# Patient Record
Sex: MALE | Race: WHITE | NOT HISPANIC OR LATINO | Employment: FULL TIME | URBAN - METROPOLITAN AREA
[De-identification: names, ages, dates, MRNs, and addresses within clinical notes are randomized per-mention and may not be internally consistent; named-entity substitution may affect disease eponyms.]

---

## 2020-02-14 ENCOUNTER — AMBULATORY - HEALTHEAST (OUTPATIENT)
Dept: LAB | Facility: CLINIC | Age: 30
End: 2020-02-14

## 2020-02-14 DIAGNOSIS — Z01.818 PREOPERATIVE GENERAL PHYSICAL EXAMINATION: ICD-10-CM

## 2020-02-14 LAB
ANION GAP SERPL CALCULATED.3IONS-SCNC: 8 MMOL/L (ref 5–18)
BASOPHILS # BLD AUTO: 0.1 THOU/UL (ref 0–0.2)
BASOPHILS NFR BLD AUTO: 1 % (ref 0–2)
BUN SERPL-MCNC: 12 MG/DL (ref 8–22)
CALCIUM SERPL-MCNC: 9.9 MG/DL (ref 8.5–10.5)
CHLORIDE BLD-SCNC: 101 MMOL/L (ref 98–107)
CO2 SERPL-SCNC: 28 MMOL/L (ref 22–31)
CREAT SERPL-MCNC: 0.73 MG/DL (ref 0.7–1.3)
EOSINOPHIL # BLD AUTO: 0.1 THOU/UL (ref 0–0.4)
EOSINOPHIL NFR BLD AUTO: 2 % (ref 0–6)
ERYTHROCYTE [DISTWIDTH] IN BLOOD BY AUTOMATED COUNT: 11.7 % (ref 11–14.5)
GFR SERPL CREATININE-BSD FRML MDRD: >60 ML/MIN/1.73M2
GLUCOSE BLD-MCNC: 109 MG/DL (ref 70–125)
HCT VFR BLD AUTO: 44.1 % (ref 40–54)
HGB BLD-MCNC: 15.5 G/DL (ref 14–18)
LYMPHOCYTES # BLD AUTO: 1.2 THOU/UL (ref 0.8–4.4)
LYMPHOCYTES NFR BLD AUTO: 18 % (ref 20–40)
MCH RBC QN AUTO: 30.9 PG (ref 27–34)
MCHC RBC AUTO-ENTMCNC: 35.1 G/DL (ref 32–36)
MCV RBC AUTO: 88 FL (ref 80–100)
MONOCYTES # BLD AUTO: 0.4 THOU/UL (ref 0–0.9)
MONOCYTES NFR BLD AUTO: 6 % (ref 2–10)
NEUTROPHILS # BLD AUTO: 4.9 THOU/UL (ref 2–7.7)
NEUTROPHILS NFR BLD AUTO: 73 % (ref 50–70)
PLATELET # BLD AUTO: 144 THOU/UL (ref 140–440)
PMV BLD AUTO: 9.3 FL (ref 8.5–12.5)
POTASSIUM BLD-SCNC: 4.3 MMOL/L (ref 3.5–5)
RBC # BLD AUTO: 5.02 MILL/UL (ref 4.4–6.2)
SODIUM SERPL-SCNC: 137 MMOL/L (ref 136–145)
WBC: 6.7 THOU/UL (ref 4–11)

## 2021-08-28 ENCOUNTER — NURSE TRIAGE (OUTPATIENT)
Dept: NURSING | Facility: CLINIC | Age: 31
End: 2021-08-28

## 2021-08-29 ENCOUNTER — HOSPITAL ENCOUNTER (EMERGENCY)
Facility: CLINIC | Age: 31
Discharge: HOME | End: 2021-08-29
Attending: EMERGENCY MEDICINE | Admitting: EMERGENCY MEDICINE
Payer: COMMERCIAL

## 2021-08-29 ENCOUNTER — OFFICE VISIT (OUTPATIENT)
Dept: FAMILY MEDICINE | Facility: CLINIC | Age: 31
End: 2021-08-29
Payer: COMMERCIAL

## 2021-08-29 VITALS
SYSTOLIC BLOOD PRESSURE: 109 MMHG | TEMPERATURE: 98.1 F | DIASTOLIC BLOOD PRESSURE: 62 MMHG | OXYGEN SATURATION: 96 % | BODY MASS INDEX: 21.74 KG/M2 | HEART RATE: 85 BPM | WEIGHT: 169.31 LBS

## 2021-08-29 VITALS
TEMPERATURE: 97.6 F | HEIGHT: 74 IN | SYSTOLIC BLOOD PRESSURE: 115 MMHG | DIASTOLIC BLOOD PRESSURE: 75 MMHG | RESPIRATION RATE: 16 BRPM | OXYGEN SATURATION: 99 % | WEIGHT: 169.5 LBS | BODY MASS INDEX: 21.75 KG/M2 | HEART RATE: 73 BPM

## 2021-08-29 DIAGNOSIS — R63.4 WEIGHT LOSS: ICD-10-CM

## 2021-08-29 DIAGNOSIS — R73.9 HYPERGLYCEMIA: ICD-10-CM

## 2021-08-29 DIAGNOSIS — D69.6 THROMBOCYTOPENIA (H): ICD-10-CM

## 2021-08-29 DIAGNOSIS — R82.4 KETONURIA: ICD-10-CM

## 2021-08-29 DIAGNOSIS — E11.9 DIABETES MELLITUS, NEW ONSET (H): ICD-10-CM

## 2021-08-29 DIAGNOSIS — R73.9 ELEVATED BLOOD SUGAR: Primary | ICD-10-CM

## 2021-08-29 PROBLEM — F19.10 POLYSUBSTANCE ABUSE (H): Status: ACTIVE | Noted: 2019-03-15

## 2021-08-29 PROBLEM — N35.912 BULBOUS URETHRAL STRICTURE: Status: ACTIVE | Noted: 2020-02-03

## 2021-08-29 PROBLEM — F33.41 RECURRENT MAJOR DEPRESSIVE DISORDER, IN PARTIAL REMISSION (H): Status: ACTIVE | Noted: 2017-06-21

## 2021-08-29 PROBLEM — F17.200 TOBACCO USE DISORDER: Status: ACTIVE | Noted: 2017-06-21

## 2021-08-29 LAB
ALBUMIN SERPL-MCNC: 4.6 G/DL (ref 3.5–5)
ALBUMIN UR-MCNC: NEGATIVE MG/DL
ALP SERPL-CCNC: 111 U/L (ref 45–120)
ALT SERPL W P-5'-P-CCNC: 26 U/L (ref 0–45)
ANION GAP SERPL CALCULATED.3IONS-SCNC: 10 MMOL/L (ref 5–18)
ANION GAP SERPL CALCULATED.3IONS-SCNC: 15 MMOL/L (ref 5–18)
APPEARANCE UR: CLEAR
AST SERPL W P-5'-P-CCNC: 23 U/L (ref 0–40)
BASE EXCESS BLDV CALC-SCNC: 1.6 MMOL/L
BASE EXCESS BLDV CALC-SCNC: 4 MMOL/L
BILIRUB DIRECT SERPL-MCNC: 0.3 MG/DL
BILIRUB SERPL-MCNC: 1 MG/DL (ref 0–1)
BILIRUB UR QL STRIP: NEGATIVE
BUN SERPL-MCNC: 10 MG/DL (ref 8–22)
BUN SERPL-MCNC: 8 MG/DL (ref 8–22)
CALCIUM SERPL-MCNC: 9.6 MG/DL (ref 8.5–10.5)
CALCIUM SERPL-MCNC: 9.6 MG/DL (ref 8.5–10.5)
CHLORIDE BLD-SCNC: 94 MMOL/L (ref 98–107)
CHLORIDE BLD-SCNC: 98 MMOL/L (ref 98–107)
CO2 SERPL-SCNC: 16 MMOL/L (ref 22–31)
CO2 SERPL-SCNC: 27 MMOL/L (ref 22–31)
COLOR UR AUTO: YELLOW
CREAT SERPL-MCNC: 0.75 MG/DL (ref 0.7–1.3)
CREAT SERPL-MCNC: 0.81 MG/DL (ref 0.7–1.3)
ERYTHROCYTE [DISTWIDTH] IN BLOOD BY AUTOMATED COUNT: 11.9 % (ref 10–15)
GFR SERPL CREATININE-BSD FRML MDRD: >90 ML/MIN/1.73M2
GFR SERPL CREATININE-BSD FRML MDRD: >90 ML/MIN/1.73M2
GLUCOSE BLD-MCNC: 280 MG/DL (ref 70–125)
GLUCOSE BLD-MCNC: 289 MG/DL (ref 70–125)
GLUCOSE BLDC GLUCOMTR-MCNC: 272 MG/DL (ref 70–125)
GLUCOSE UR STRIP-MCNC: 500 MG/DL
HBA1C MFR BLD: >14 %
HCO3 BLDV-SCNC: 25 MMOL/L (ref 24–30)
HCO3 BLDV-SCNC: 26 MMOL/L (ref 24–30)
HCT VFR BLD AUTO: 41.6 % (ref 40–53)
HGB BLD-MCNC: 14.5 G/DL (ref 13.3–17.7)
HGB UR QL STRIP: NEGATIVE
KETONES BLD-SCNC: 0.58 MMOL/L
KETONES BLD-SCNC: 1.71 MMOL/L
KETONES UR STRIP-MCNC: >=160 MG/DL
LEUKOCYTE ESTERASE UR QL STRIP: NEGATIVE
MCH RBC QN AUTO: 29.4 PG (ref 26.5–33)
MCHC RBC AUTO-ENTMCNC: 34.9 G/DL (ref 31.5–36.5)
MCV RBC AUTO: 84 FL (ref 78–100)
NITRATE UR QL: NEGATIVE
OXYHGB MFR BLDV: 66.8 % (ref 70–75)
OXYHGB MFR BLDV: 78.4 % (ref 70–75)
PCO2 BLDV: 47 MM HG (ref 35–50)
PCO2 BLDV: 50 MM HG (ref 35–50)
PH BLDV: 7.37 [PH] (ref 7.35–7.45)
PH BLDV: 7.37 [PH] (ref 7.35–7.45)
PH UR STRIP: 5 [PH] (ref 5–8)
PLATELET # BLD AUTO: 54 10E3/UL (ref 150–450)
PO2 BLDV: 34 MM HG (ref 25–47)
PO2 BLDV: 45 MM HG (ref 25–47)
POTASSIUM BLD-SCNC: 4 MMOL/L (ref 3.5–5)
POTASSIUM BLD-SCNC: ABNORMAL MMOL/L
PROT SERPL-MCNC: 6.9 G/DL (ref 6–8)
RBC # BLD AUTO: 4.94 10E6/UL (ref 4.4–5.9)
SAO2 % BLDV: 71 % (ref 70–75)
SAO2 % BLDV: 83.9 % (ref 70–75)
SODIUM SERPL-SCNC: 129 MMOL/L (ref 136–145)
SODIUM SERPL-SCNC: 131 MMOL/L (ref 136–145)
SP GR UR STRIP: >=1.03 (ref 1–1.03)
UROBILINOGEN UR STRIP-ACNC: 0.2 E.U./DL
WBC # BLD AUTO: 4.2 10E3/UL (ref 4–11)

## 2021-08-29 PROCEDURE — 82010 KETONE BODYS QUAN: CPT | Performed by: EMERGENCY MEDICINE

## 2021-08-29 PROCEDURE — 82805 BLOOD GASES W/O2 SATURATION: CPT | Performed by: EMERGENCY MEDICINE

## 2021-08-29 PROCEDURE — 96372 THER/PROPH/DIAG INJ SC/IM: CPT

## 2021-08-29 PROCEDURE — 82248 BILIRUBIN DIRECT: CPT | Performed by: EMERGENCY MEDICINE

## 2021-08-29 PROCEDURE — 36415 COLL VENOUS BLD VENIPUNCTURE: CPT | Performed by: EMERGENCY MEDICINE

## 2021-08-29 PROCEDURE — 82374 ASSAY BLOOD CARBON DIOXIDE: CPT | Performed by: EMERGENCY MEDICINE

## 2021-08-29 PROCEDURE — 96372 THER/PROPH/DIAG INJ SC/IM: CPT | Performed by: EMERGENCY MEDICINE

## 2021-08-29 PROCEDURE — 250N000012 HC RX MED GY IP 250 OP 636 PS 637: Performed by: EMERGENCY MEDICINE

## 2021-08-29 PROCEDURE — 81003 URINALYSIS AUTO W/O SCOPE: CPT | Performed by: FAMILY MEDICINE

## 2021-08-29 PROCEDURE — 82947 ASSAY GLUCOSE BLOOD QUANT: CPT | Performed by: EMERGENCY MEDICINE

## 2021-08-29 PROCEDURE — 99204 OFFICE O/P NEW MOD 45 MIN: CPT | Performed by: FAMILY MEDICINE

## 2021-08-29 PROCEDURE — 250N000012 HC RX MED GY IP 250 OP 636 PS 637

## 2021-08-29 PROCEDURE — 99284 EMERGENCY DEPT VISIT MOD MDM: CPT | Mod: 25

## 2021-08-29 PROCEDURE — 84295 ASSAY OF SERUM SODIUM: CPT | Performed by: EMERGENCY MEDICINE

## 2021-08-29 PROCEDURE — 85027 COMPLETE CBC AUTOMATED: CPT | Performed by: EMERGENCY MEDICINE

## 2021-08-29 PROCEDURE — 83036 HEMOGLOBIN GLYCOSYLATED A1C: CPT | Performed by: EMERGENCY MEDICINE

## 2021-08-29 RX ORDER — SERTRALINE HYDROCHLORIDE 100 MG/1
100 TABLET, FILM COATED ORAL DAILY
COMMUNITY

## 2021-08-29 RX ORDER — BUPRENORPHINE AND NALOXONE 8; 2 MG/1; MG/1
1 FILM, SOLUBLE BUCCAL; SUBLINGUAL 2 TIMES DAILY
COMMUNITY

## 2021-08-29 RX ORDER — SODIUM CHLORIDE 9 MG/ML
INJECTION, SOLUTION INTRAVENOUS CONTINUOUS
Status: DISCONTINUED | OUTPATIENT
Start: 2021-08-29 | End: 2021-08-29

## 2021-08-29 RX ORDER — DISULFIRAM 250 MG/1
250 TABLET ORAL DAILY
COMMUNITY
End: 2021-09-03 | Stop reason: ALTCHOICE

## 2021-08-29 RX ORDER — DISULFIRAM 250 MG/1
TABLET ORAL
COMMUNITY
Start: 2021-08-26 | End: 2021-08-29

## 2021-08-29 RX ORDER — BUPRENORPHINE AND NALOXONE 4; 1 MG/1; MG/1
1 FILM, SOLUBLE BUCCAL; SUBLINGUAL
COMMUNITY
End: 2023-03-14

## 2021-08-29 RX ADMIN — HUMAN INSULIN 8 UNITS: 100 INJECTION, SOLUTION SUBCUTANEOUS at 15:23

## 2021-08-29 ASSESSMENT — MIFFLIN-ST. JEOR: SCORE: 1788.6

## 2021-08-29 NOTE — ED TRIAGE NOTES
Patient states he was an everyday drinker, he stopped in May and since has lost 50lbs, went to clinic today and blood sugars in 400's.  Patient c/o BLE pain also.  Patient has been fasting since yesterday.  BS in triage is 272.

## 2021-08-29 NOTE — PHARMACY-ADMISSION MEDICATION HISTORY
Pharmacy Note - Admission Medication History    Pertinent Provider Information: has not taken disulfuram for a couple of weeks     ______________________________________________________________________    Prior To Admission (PTA) med list completed and updated in EMR.       PTA Med List   Medication Sig Last Dose     buprenorphine HCl-naloxone HCl (SUBOXONE) 4-1 MG per film Place 1 Film under the tongue every evening as needed Past Week at Unknown time     buprenorphine HCl-naloxone HCl (SUBOXONE) 8-2 MG per film Place 1 Film under the tongue 2 times daily 8/29/2021 at Unknown time     disulfiram (ANTABUSE) 250 MG tablet Take 250 mg by mouth daily 8/14/2021            sertraline (ZOLOFT) 100 MG tablet Take 100 mg by mouth daily 8/29/2021 at Unknown time       Information source(s): Patient, [facility] and [place] Prescription Monitoring Program  Method of interview communication: in-person    Summary of Changes to PTA Med List  New: all      Patient was asked about OTC/herbal products specifically.  PTA med list reflects this.    In the past week, patient estimated taking medication this percent of the time:  greater than 90%.    Allergies were reviewed, assessed, and updated with the patient.      Patient does not anticipate needing any multi-use medications during admission.    The information provided in this note is only as accurate as the sources available at the time of the update(s).    Thank you for the opportunity to participate in the care of this patient.    Kilo Jones [facility]  8/29/2021 4:16 PM

## 2021-08-29 NOTE — ED PROVIDER NOTES
EMERGENCY DEPARTMENT ENCOUNTER      NAME: Hieu Carson  AGE: 31 year old male  YOB: 1990  MRN: [id number]  EVALUATION DATE & TIME: 8/29/2021  1:03 PM    PCP: [facility]    ED PROVIDER: Kim Rivera MD    Chief Complaint   Patient presents with     Hyperglycemia         FINAL IMPRESSION:  1. Diabetes mellitus, new onset (H)    2. Hyperglycemia          ED COURSE & MEDICAL DECISION MAKING:    Pertinent Labs & Imaging studies reviewed. (See chart for details)  31 year old male with history of urethral stricture opioid and tobacco abuse who presents to the Emergency Department for evaluation of 50 pound weight loss over the course the last several months since he stopped drinking alcohol with paresthesias in his fingertips and toes.  Blood sugar high with ketones in the urine prior to arrival.  Symptoms consistent with new onset diabetes.  Concern for dehydration, electrolyte abnormality, DKA, HHS.    Urinalysis already obtained showing ketones.  Hemoglobin A1c sent.  CBC, BMP, VBG, beta hydroxybutyrate pending.  If no signs of DKA/HHS, will start on Metformin for home and have him follow-up with PCP to establish care.  Referral provided.  Patient signed out to Dr. Vivas awaiting lab testing for hopeful discharge to home.          1:11 PM  I evaluated the patient to gather history and perform initial exam. ED course and treatment plan was discussed.  PPE worn: eye protection, surgical mask, n95 mask.      At the conclusion of the encounter I discussed the results of all of the tests and the disposition. The questions were answered. The patient or family acknowledged understanding and was agreeable with the care plan.      MEDICATIONS GIVEN IN THE EMERGENCY:  Medications - No data to display    NEW PRESCRIPTIONS STARTED AT TODAY'S ER VISIT  New Prescriptions    METFORMIN (GLUCOPHAGE) 1000 MG TABLET    Take 1 tablet (1,000 mg) by mouth 2 times daily (with meals)           =================================================================    HPI    Patient information was obtained from: patient     Use of Intrepreter: N/A      Hieu Carson is a 31 year old male with pertinent medical history of opiate dependence, opiate withdrawal, polysubstance abuse, tobacco use disorder who presents hyperglycemia and numbness.    Patient reports he went to the clinic today and was found to have high blood sugars in 400s and sent to ED for further evaluation. He says that he also endorses numbness to his fingers and toes.     He used to drink everyday but has stopped in May and had lost a significant amount of weight. He has no regular PCP. No known PMHx of DM but does have FMHx of grandmothers with DM. Denies abdominal cramping. No other concerns or complaints at this time.         REVIEW OF SYSTEMS  Constitutional:  Denies fever, chills, weight loss or weakness  GI:  Denies abdominal pain, nausea, vomiting, diarrhea  : Denies dysuria, denies hematuria  Musculoskeletal:  Denies any new muscle/joint pain, swelling or loss of function.  Skin:  Denies rash, pallor  Neurologic:  Denies headache, focal weakness or sensory changes. Positive for numbness in fingers and toes.     All other systems negative unless noted in HPI.      PAST MEDICAL HISTORY:  Past Medical History:   Diagnosis Date     Depressive disorder      Opioid abuse (H)      Tobacco abuse      Urethral stricture        PAST SURGICAL HISTORY:  Past Surgical History:   Procedure Laterality Date     GENITOURINARY SURGERY       URETHRAL DILATION         CURRENT MEDICATIONS:    Prior to Admission Medications   Prescriptions Last Dose Informant Patient Reported? Taking?   buprenorphine-naloxone (SUBOXONE) 2-0.5 MG SUBL   No No   Sig: Place  under the tongue. Take 1 tab at 1600 today, then  Take 1 tab 2 times daily for 1 days, then  Take 1 tab 1 times daily for 4 days, then  Take 0.5 tabs 1 times daily for 4 days   disulfiram (ANTABUSE)  "250 MG tablet   Yes No   sertraline (ZOLOFT) 50 MG tablet   Yes No   Sig: Take 50 mg by mouth daily.   sertraline (ZOLOFT) 50 MG tablet   No No   Sig: Take 1 tablet by mouth daily.      Facility-Administered Medications: None       ALLERGIES:  No Known Allergies    FAMILY HISTORY:  History reviewed. No pertinent family history.    SOCIAL HISTORY:  Social History     Tobacco Use     Smoking status: Current Every Day Smoker     Packs/day: 0.50     Years: 4.00     Pack years: 2.00     Smokeless tobacco: Never Used   Substance Use Topics     Alcohol use: Yes     Comment: occasional     Drug use: Yes     Comment: heroin        VITALS:  Patient Vitals for the past 24 hrs:   BP Temp Temp src Pulse Resp SpO2 Height Weight   08/29/21 1242 112/73 97.6  F (36.4  C) Oral 74 16 99 % 1.88 m (6' 2\") 76.9 kg (169 lb 8 oz)       PHYSICAL EXAM    General Appearance: Well-appearing, well-nourished, no acute distress   Head:  Normocephalic  Eyes:  conjunctiva/corneas clear  ENT:  membranes are moist without pallor  Neck:  Supple  Cardio:  Regular rate and rhythm  Pulm:  No respiratory distress  Back:   No CVA tenderness, normal ROM  Abdomen:  Soft, non-tender, non distended,no rebound or guarding.  Extremities: Moves all extremities normally, normal gait  Skin:  Skin warm, dry, no rashes  Neuro:  Alert and oriented ×3, moving all extremities, no gross sensory defects. Has some paresthesia to fingers and toes.     RADIOLOGY/LABS:  Reviewed all pertinent imaging. Please see official radiology report. All pertinent labs reviewed and interpreted.    Results for orders placed or performed during the hospital encounter of 08/29/21   Blood gas venous   Result Value Ref Range    pH Venous 7.37 7.35 - 7.45    pCO2 Venous 47 35 - 50 mm Hg    pO2 Venous 45 25 - 47 mm Hg    Bicarbonate Venous 25 24 - 30 mmol/L    Base Excess/Deficit (+/-) 1.6   mmol/L    Oxyhemoglobin Venous 78.4 (H) 70.0 - 75.0 %    O2 Sat, Venous 83.9 (H) 70.0 - 75.0 %   Glucose " by meter   Result Value Ref Range    GLUCOSE BY METER POCT 272 (H) 70 - 125 mg/dL       The creation of this record is based on the scribe s observations of the work being performed by Kim Rivera MD and the provider s statements to them. It was created on his behalf by Ting Mixon, a trained medical scribe. This document has been checked and approved by the attending provider.    Kim Rivera MD  Emergency Medicine  [facility] EMERGENCY ROOM  [address] [id number] [phone]  Dept: [phone]     Kim Rivera MD  08/29/21 2797

## 2021-08-29 NOTE — PROGRESS NOTES
Assessment/Plan:   Weight loss  Elevated blood sugar  Ketonuria  Significant weight loss since May. Increased thirst and urinary frequency last month, less now. Finger stick glucose at home was 400.   Comes in to be evaluated for new diabetes.   Has significant ketones in urine and glucose in urine today and some vague paresthesias in legs earlier. Vital signs stable but feel he needs to be evaluated for DKA and therefore transfer to the  ED. Patient and father are agreeable.   Follow up as planned with primary care on Friday.   Will submit a referral to the diabetic nurse educator.   - Urinalysis Macroscopic - lab collect    Report called to ER Provider.     Subjective:     Hieu Carson is a 31 year old male who presents with his dad for evaluation of extensive weight loss and an elevated blood sugar level noted at home.   He has always been thin but over the last year he was drinking a lot and gained weight up to about 225lbs. In May he stopped drinking cold turkey and had a significant weight loss which has continued but tapered a bit, losing more slowly now.   He assumed it was due to abstaining from alcohol.   His grandma visited this week and was concerned he might have diabetes. She checked his blood sugar with her fingerstick system and his level was 405 on Thursday. He has made an appointment to be seen in primary care (doesn't have a current PCP) which is on Friday.   He has had some increased urinary frequency but not dramatic. He noted more increased thirst last month, less now.   He has been experiencing numb toes - persistent - and just today an abnormal sensation in his legs, like no circulation.   No fever or chills. No N/V/D. No belly pain.   No prior history of DM, known pancreatitis or hepatitis.   He has past history of IV drug use as well.    Family history of type two DM in grandparents.      No Known Allergies    Current Outpatient Medications   Medication     buprenorphine HCl-naloxone HCl  (SUBOXONE) 4-1 MG per film     buprenorphine HCl-naloxone HCl (SUBOXONE) 8-2 MG per film     disulfiram (ANTABUSE) 250 MG tablet     metFORMIN (GLUCOPHAGE) 1000 MG tablet     sertraline (ZOLOFT) 100 MG tablet     No current facility-administered medications for this visit.     Patient Active Problem List   Diagnosis     Opiate dependence (H)     Opiate withdrawal (H)     Bulbous urethral stricture     Polysubstance abuse (H)     Recurrent major depressive disorder, in partial remission (H)     Tobacco use disorder       Objective:     /62   Pulse 85   Temp 98.1  F (36.7  C)   Wt 76.8 kg (169 lb 5 oz)   SpO2 96%   BMI 21.74 kg/m      Physical  General Appearance: Alert, cooperative, no distress. AVSS  Head: Normocephalic, without obvious abnormality, atraumatic  Eyes: Conjunctivae are normal.   Throat: pink mucosa, moist, not fissured  Lungs: respirations unlabored  Abdomen: Soft, non-tender  Extremities: No lower extremity edema. Palpable pedal pulses and normal cap refill, subjective tingling bottoms of toes.   Skin: Skin color, turgor normal  Psychiatric: Patient has a normal mood and affect.       Results for orders placed or performed in visit on 08/29/21   Urinalysis Macroscopic - lab collect     Status: Abnormal   Result Value Ref Range    Color Urine Yellow Colorless, Straw, Light Yellow, Yellow    Appearance Urine Clear Clear    Glucose Urine 500  (A) Negative mg/dL    Bilirubin Urine Negative Negative    Ketones Urine >=160 (A) Negative mg/dL    Specific Gravity Urine >=1.030 1.005 - 1.030    Blood Urine Negative Negative    pH Urine 5.0 5.0 - 8.0    Protein Albumin Urine Negative Negative mg/dL    Urobilinogen Urine 0.2 0.2, 1.0 E.U./dL    Nitrite Urine Negative Negative    Leukocyte Esterase Urine Negative Negative

## 2021-08-29 NOTE — ED PROVIDER NOTES
eMERGENCY dEPARTMENT PROGRESS NOTE      Patient was signed out to me by Dr. Rivera at 2:08 PM. I will follow up on test results as soon as the labs come in.   2:46 PM I am going to update the patient on his test results and the plan. Patient has a history of IV drug use and would like to avoid any IV's right now. We are going to give the insulin sub-q and have the patient rehydrate orally.  7:13 PM We discussed the plan for discharge and the patient is agreeable. Reviewed supportive cares, symptomatic treatment, outpatient follow up, and reasons to return to the Emergency Department. Patient to be discharged by ED RN.     Brief HPI;  High blood sugars (400s) in clinic today. Endorses nmbness in his fingers and toes. Family history of diabetes. No abdominal pain or any other concerns.     Pertinent lab and radiology results reviewed.    The patient was eating a snack and drinking a energy drink when I came into the room to give him his initial test results  and discussed the plan.  He was told not to eat anything more.  He did however continue to eat while he was in the room.  He was noted to have an elevated glucose, low bicarb, elevated beta hydroxybutyrate but his pH on the VBG was 7.37.  As there are no beds available and he would be in the ER I opted to give him a dose of subcu insulin  And hydrate him.  Initially I ordered an IV but he refuses as it history of IV drug use and has had great difficulty getting IVs placed on him.  After 2 hours and his lab work was repeated and his bicarb normalized, his beta hydroxybutyrate had decreased substantially.  His slightly decreased sodium had normalized as well.  He still with a blood sugar in the 200s but again he was eating during this period of time and so it was expected.  We discussed having him continue to stay hydrated at home.  Dr. Menendez has written a prescription for Metformin which she is going to start.  He had normal renal function and hepatic function but  as well be following up with his primary care physician's office this week as he already has an appointment for this Friday.  FINAL IMPRESSION    1. Diabetes mellitus, new onset (H)    2. Hyperglycemia    3. Thrombocytopenia (H)             Joey Vivas MD  08/29/21 1717

## 2021-08-29 NOTE — ED PROVIDER NOTES
Expected Patient Referral to ED  12:15 PM    Referring Clinic/Provider:  Dr. Marshall, walk in clinic    Reason for referral/Clinical facts:  32 y/o m   50lb wt loss since may  Thought due to stopping etoh (had been drinking heavily the last year and gained weight)  Grandma checked BG and it was in 400s  UA at walk in >160ketones    Recommendations provided:  none    Caller was informed that this institution does  possess the capabilities and/or resources to provide for patient and should be transferred to our institution.    Based on the information provided, discussed that this patient likely is nota good candidate for direct admission to this institution and that provider could proceed as such.  If however direct admit is sought and any hurdles encountered, this ED would be happy to see the patient and evaluate.    Informed caller that recommendations provided are recommendations based only on the facts provided and that they responsible to accept or reject the advice, or to seek a formal in person consultation as needed and that this ED will see/treat patient should they arrive.      Kim Rivera MD  Emergency Medicine  [facility] EMERGENCY ROOM  [address] [id number] [phone]     Kim Rivera MD  08/29/21 9714

## 2021-09-03 ENCOUNTER — OFFICE VISIT (OUTPATIENT)
Dept: FAMILY MEDICINE | Facility: CLINIC | Age: 31
End: 2021-09-03
Payer: COMMERCIAL

## 2021-09-03 VITALS
HEART RATE: 84 BPM | SYSTOLIC BLOOD PRESSURE: 112 MMHG | BODY MASS INDEX: 22.7 KG/M2 | WEIGHT: 176.8 LBS | DIASTOLIC BLOOD PRESSURE: 74 MMHG

## 2021-09-03 DIAGNOSIS — R10.11 RUQ ABDOMINAL PAIN: ICD-10-CM

## 2021-09-03 DIAGNOSIS — E11.9 TYPE 2 DIABETES MELLITUS WITHOUT COMPLICATION, WITHOUT LONG-TERM CURRENT USE OF INSULIN (H): Primary | ICD-10-CM

## 2021-09-03 LAB — GLUCOSE BLD-MCNC: 325 MG/DL (ref 79–116)

## 2021-09-03 PROCEDURE — 82947 ASSAY GLUCOSE BLOOD QUANT: CPT | Performed by: FAMILY MEDICINE

## 2021-09-03 PROCEDURE — 36416 COLLJ CAPILLARY BLOOD SPEC: CPT | Performed by: FAMILY MEDICINE

## 2021-09-03 PROCEDURE — 99214 OFFICE O/P EST MOD 30 MIN: CPT | Performed by: FAMILY MEDICINE

## 2021-09-03 RX ORDER — BLOOD-GLUCOSE METER
EACH MISCELLANEOUS
Qty: 1 KIT | Refills: 0 | Status: SHIPPED | OUTPATIENT
Start: 2021-09-03

## 2021-09-03 ASSESSMENT — ENCOUNTER SYMPTOMS
DIZZINESS: 0
GASTROINTESTINAL NEGATIVE: 1
PALPITATIONS: 0
HEADACHES: 0
PSYCHIATRIC NEGATIVE: 1
SHORTNESS OF BREATH: 0
CONSTITUTIONAL NEGATIVE: 1

## 2021-09-03 ASSESSMENT — ANXIETY QUESTIONNAIRES
4. TROUBLE RELAXING: SEVERAL DAYS
7. FEELING AFRAID AS IF SOMETHING AWFUL MIGHT HAPPEN: NOT AT ALL
2. NOT BEING ABLE TO STOP OR CONTROL WORRYING: NOT AT ALL
IF YOU CHECKED OFF ANY PROBLEMS ON THIS QUESTIONNAIRE, HOW DIFFICULT HAVE THESE PROBLEMS MADE IT FOR YOU TO DO YOUR WORK, TAKE CARE OF THINGS AT HOME, OR GET ALONG WITH OTHER PEOPLE: NOT DIFFICULT AT ALL
1. FEELING NERVOUS, ANXIOUS, OR ON EDGE: SEVERAL DAYS
3. WORRYING TOO MUCH ABOUT DIFFERENT THINGS: NOT AT ALL
GAD7 TOTAL SCORE: 2
5. BEING SO RESTLESS THAT IT IS HARD TO SIT STILL: NOT AT ALL
6. BECOMING EASILY ANNOYED OR IRRITABLE: NOT AT ALL

## 2021-09-03 ASSESSMENT — PATIENT HEALTH QUESTIONNAIRE - PHQ9: SUM OF ALL RESPONSES TO PHQ QUESTIONS 1-9: 1

## 2021-09-03 NOTE — PROGRESS NOTES
Assessment & Plan     Type 2 diabetes mellitus without complication, without long-term current use of insulin (H)  - [facility] Adult Diabetes Educator Referral  - metFORMIN (GLUCOPHAGE) 1000 MG tablet  Dispense: 90 tablet; Refill: 1  - Glucose by meter  - Glucose whole blood  - Glucose whole blood  - insulin glargine (LANTUS PEN) 100 UNIT/ML pen  Dispense: 3 mL; Refill: 1  - blood glucose monitoring (ACCU-CHEK FE PLUS) meter device kit  Dispense: 1 kit; Refill: 0    RUQ abdominal pain  - US Abdomen Complete  I discussed with him as fully as I can the issues with management of diabetes mellitus. Unfortunately at this time 1 cannot really say it is type 2 diabetes mellitus of type 1 diabetes mellitus. Because of that I think that we will need to do a lot more evaluations. But he went to with a little bit from now before continuing with evaluations for that. I did do a blood glucose level for him which is high today. Though that does not necessarily mean that it is type I because he has been on Metformin for just a couple of days but I will go ahead and have him started on insulin and did prescribe insulin as well as monitoring meter for him. I also referred him to diabetic educator. He had noted having increasing abdominal pain especially around the upper quadrants when he was still drinking and thinks that he has had liver inflammation. Will get an ultrasound of the abdomen to evaluate for that.    Tobacco Cessation:   reports that he has been smoking. He has a 2.00 pack-year smoking history. He has never used smokeless tobacco.      No follow-ups on file.    Arslan Faulkner MD  [facility]             Maurilio Hagen is a 31 year old who presents for the following health issues     HPI   Patient is a new patient to me coming into the follow-up of emergency room visit that was made because of high blood glucose that was recorded at home.  Glucose was said to be 400.  He had noted  that prior to that he has lost a lot of weight about 60 pounds unintentionally and have been a prior heavy alcohol drinker.  He had stopped drinking at this time.  Seen in the emergency room he was tested, his A1c was more than 14%.  He was given subcutaneous insulin and encouraged to hydrate.  He was also started on Metformin.  He had also been a intravenous drug user and because of that finding a vein is difficult for him.  But he no longer uses.  He takes medications.  Has been on Suboxone, and being on disulfiram and currently also on sertraline.  Today he noted no new concerns.  But there is a confusion regarding the type of diabetes that he has either type 2 diabetes mellitus of type 1 diabetes mellitus.    No family history on file.   Social History     Socioeconomic History     Marital status: Single     Spouse name: Not on file     Number of children: Not on file     Years of education: Not on file     Highest education level: Not on file   Occupational History     Not on file   Tobacco Use     Smoking status: Current Every Day Smoker     Packs/day: 0.50     Years: 4.00     Pack years: 2.00     Smokeless tobacco: Never Used   Substance and Sexual Activity     Alcohol use: Yes     Comment: occasional     Drug use: Yes     Comment: heroin     Sexual activity: Not on file   Other Topics Concern     Not on file   Social History Narrative     Not on file     Social Determinants of Health     Financial Resource Strain:      Difficulty of Paying Living Expenses:    Food Insecurity:      Worried About Running Out of Food in the Last Year:      Ran Out of Food in the Last Year:    Transportation Needs:      Lack of Transportation (Medical):      Lack of Transportation (Non-Medical):    Physical Activity:      Days of Exercise per Week:      Minutes of Exercise per Session:    Stress:      Feeling of Stress :    Social Connections:      Frequency of Communication with Friends and Family:      Frequency of Social  Gatherings with Friends and Family:      Attends Orthodoxy Services:      Active Member of Clubs or Organizations:      Attends Club or Organization Meetings:      Marital Status:    Intimate Partner Violence:      Fear of Current or Ex-Partner:      Emotionally Abused:      Physically Abused:      Sexually Abused:       Past Surgical History:   Procedure Laterality Date     GENITOURINARY SURGERY       URETHRAL DILATION        Past Medical History:   Diagnosis Date     Depressive disorder      Opioid abuse (H)      Tobacco abuse      Urethral stricture       Review of Systems   Constitutional: Negative.    HENT: Negative.    Respiratory: Negative for shortness of breath.    Cardiovascular: Negative for chest pain and palpitations.   Gastrointestinal: Negative.    Neurological: Negative for dizziness and headaches.   Psychiatric/Behavioral: Negative.         Objective    /74   Pulse 84   Wt 80.2 kg (176 lb 12.8 oz)   BMI 22.70 kg/m    Body mass index is 22.7 kg/m .  Physical Exam  Constitutional:       Appearance: Normal appearance.   Eyes:      Pupils: Pupils are equal, round, and reactive to light.   Cardiovascular:      Rate and Rhythm: Normal rate and regular rhythm.      Pulses: Normal pulses.      Heart sounds: Normal heart sounds.   Pulmonary:      Effort: Pulmonary effort is normal.      Breath sounds: Normal breath sounds.   Abdominal:      General: Abdomen is flat. Bowel sounds are normal. There is no distension.      Palpations: Abdomen is soft. There is no mass.   Musculoskeletal:         General: No swelling or tenderness. Normal range of motion.      Cervical back: Normal range of motion.   Skin:     General: Skin is warm.   Neurological:      Mental Status: He is alert.   Psychiatric:         Mood and Affect: Mood normal.         Behavior: Behavior normal.         Thought Content: Thought content normal.

## 2021-09-07 ENCOUNTER — TELEPHONE (OUTPATIENT)
Dept: FAMILY MEDICINE | Facility: CLINIC | Age: 31
End: 2021-09-07

## 2021-09-07 DIAGNOSIS — E11.9 TYPE 2 DIABETES MELLITUS WITHOUT COMPLICATION, WITHOUT LONG-TERM CURRENT USE OF INSULIN (H): Primary | ICD-10-CM

## 2021-09-07 NOTE — TELEPHONE ENCOUNTER
"Fax from pharmacy:    Accu-Chek Guide Care Kit: \"Does Mr. PALENCIA need test strips, lancets, ect too? Please send Rxs for those items if necessary.\"    Lantus Solostar Pen Inj 3 mL: \"Does Mr. PALENCIA need pen needles to use this or already has some? Please send Rx for needles if necessary. Thanks.\"    Jamilah CARTER CMA ([facility])  "

## 2021-09-08 ENCOUNTER — TELEPHONE (OUTPATIENT)
Dept: FAMILY MEDICINE | Facility: CLINIC | Age: 31
End: 2021-09-08

## 2021-09-08 ENCOUNTER — VIRTUAL VISIT (OUTPATIENT)
Dept: EDUCATION SERVICES | Facility: CLINIC | Age: 31
End: 2021-09-08
Payer: COMMERCIAL

## 2021-09-08 DIAGNOSIS — E11.9 TYPE 2 DIABETES MELLITUS WITHOUT COMPLICATION, WITHOUT LONG-TERM CURRENT USE OF INSULIN (H): ICD-10-CM

## 2021-09-08 PROCEDURE — G0108 DIAB MANAGE TRN  PER INDIV: HCPCS | Performed by: DIETITIAN, REGISTERED

## 2021-09-08 NOTE — PATIENT INSTRUCTIONS
1. Keep lantus at 12 units for 9/8 and 9/9, if fasting BG is > 130 mg/dl on 9/10, increase Lantus to 14 units.  Continue to increase Lantus 2 units every three days until fasting blood sugar is below 130 mg/dl.    2. Test glucose 2-3 times daily: fasting and two hours after 1-2 meals.    3. Purchase glucose tablets.    4. If blood sugar is < 70 mg/dl, treat with 3-4 glucose tablets or 4 oz juice or regular soda, recheck blood sugar in 15 minutes, if still < 70 mg/dl, repeat steps.     5. Start exercising 3x/week for a minimum of 20-30 minutes, long term goal is to reach 150 minutes/week.    6. Keep carbohydrate intake no more than 60-75 grams per meals and 15-30 grams per snacks.     7. Call diabetes care team at [phone] for questions or concerns.    8. Follow up with Diabetes Educator on 9/15/21, 1 pm, telephone visit.      9. Educator will follow up with MD regarding lab work to rule out Type 1 Diabetes.

## 2021-09-08 NOTE — PROGRESS NOTES
"Type of Service: Telephone Visit/ 60 minutes, patient was scheduled as a video visit, but he was not sent a link for the call.     How would patient like to obtain AVS? Mail a copy        Diabetes Self-Management Education & Support    Presents for: Initial Assessment for new diagnosis    SUBJECTIVE/OBJECTIVE:  Presents for: Initial Assessment for new diagnosis  Accompanied by: Self  Diabetes education in the past 24mo: No  Focus of Visit: Monitoring, Diabetes Pathophysiology, Taking Medication, Healthy Eating, Insulin Start  Diabetes type: Other (Per MD visit note it appeared that he would like to rule out type 1 DM)  Date of diagnosis: 8/29/21  Disease course:  (New Diagnosis)  Cultural Influences/Ethnic Background:  Not  or       Diabetes Symptoms & Complications:   Patient did report s/s of hyperglycemia: thirst, frequent urination and slow healing wounds.        Patient Problem List and Family Medical History reviewed for relevant medical history, current medical status, and diabetes risk factors.    Vitals:  There were no vitals taken for this visit.  Estimated body mass index is 22.7 kg/m  as calculated from the following:    Height as of 8/29/21: 1.88 m (6' 2\").    Weight as of 9/3/21: 80.2 kg (176 lb 12.8 oz).   Last 3 BP:   BP Readings from Last 3 Encounters:   09/03/21 112/74   08/29/21 115/75   08/29/21 109/62       History   Smoking Status     Current Every Day Smoker     Packs/day: 0.50     Years: 4.00   Smokeless Tobacco     Never Used       Labs:  Lab Results   Component Value Date    A1C >14.0 08/29/2021     Lab Results   Component Value Date     08/29/2021     10/20/2011     No results found for: LDL  No results found for: HDL]  GFR Estimate   Date Value Ref Range Status   08/29/2021 >90 >60 mL/min/1.73m2 Final     Comment:     As of July 11, 2021, eGFR is calculated by the CKD-EPI creatinine equation, without race adjustment. eGFR can be influenced by muscle mass, " exercise, and diet. The reported eGFR is an estimation only and is only applicable if the renal function is stable.   02/14/2020 >60 >60 mL/min/1.73m2 Final   10/20/2011 >90 >60 mL/min/1.7m2 Final     GFR Estimate If Black   Date Value Ref Range Status   02/14/2020 >60 >60 mL/min/1.73m2 Final   10/20/2011 >90 >60 mL/min/1.7m2 Final     Lab Results   Component Value Date    CR 0.75 08/29/2021    CR 0.80 10/20/2011     No results found for: MICROALBUMIN    Healthy Eating:  Meal planning/habits: Low carb, Avoiding sweets  Breakfast: 1 fried egg or an egg mcmuffin, coffee or low sugary energy drink  Lunch: chicken tenders, vegetables, corn, or lasagna or pasta or tacos(2),  Dinner: healthier, low carb microwave meals, or meat/vegetables,  Snacks: nuts or seeds or cheese, or fruit  Beverages: Water, Sports drinks    Being Active:  Being Active Assessed Today: Yes  Patient has not regular exercise routine. He will start walking on a regular basis.     Monitoring:  Monitoring Assessed Today: Yes  Blood Glucose Meter: Accu-chek    Patient has been checking his BG since 9/4/21 at various times during the day, but not specific to fasting or pre or post meal. He has noted blood glucose range from 160-312 mg/dl     Taking Medications:  Diabetes Medication(s)     Biguanides       metFORMIN (GLUCOPHAGE) 1000 MG tablet    Take 1 tablet (1,000 mg) by mouth 2 times daily (with meals)    Insulin       insulin glargine (LANTUS PEN) 100 UNIT/ML pen    Inject 10 Units Subcutaneous At Bedtime               Problem Solving:   discussion was held on s/s of hypo and hyperglycemia and treatment.               Reducing Risks:       Healthy Coping:     Patient Activation Measure Survey Score:  No flowsheet data found.    Diabetes knowledge and skills assessment:   Patient is knowledgeable in diabetes management concepts related to: patient has been reading about Diabetes and is learning about physiology of DM, diet, benefits of exercise.      Patient needs further education on the following diabetes management concepts: Being Active, Monitoring, Taking Medication and Reducing Risks    Based on learning assessment above, most appropriate setting for further diabetes education would be: Individual setting.      INTERVENTIONS:    Education provided today on:  AADE Self-Care Behaviors:  Diabetes Pathophysiology  Healthy Eating: carbohydrate counting, consistency in amount, composition, and timing of food intake, label reading and weight gain  Being Active: relationship to blood glucose and describe appropriate activity program  Monitoring: log and interpret results, individual blood glucose targets and frequency of monitoring  Taking Medication: action of prescribed medication, drawing up, administering and storing injectable diabetes medications and proper site selection and rotation for injections  Problem Solving: high blood glucose - causes, signs/symptoms, treatment and prevention, low blood glucose - causes, signs/symptoms, treatment and prevention, carrying a carbohydrate source at all times and safe travel  Insulin administration technique taught today. Patient verbalized understanding and was able to perform an accurate return demonstration of administration technique.    Opportunities for ongoing education and support in diabetes-self management were discussed.    Pt verbalized understanding of concepts discussed and recommendations provided today.       Education Materials Provided:  Living Healthy with Diabetes, Carbohydrate Counting, Hypoglycemia Signs and Symptoms and My Plate Planner      ASSESSMENT:  Initial DM education today for Hieu who was recently diagnosed with Diabetes.  Patient is unsure if he has Type 1 or Type 2 DM, no further testing had been done per patient.  He has been reading quite a bit about DM and making dietary change since his Diagnosis.  He reported a 60# weight loss since May 2021.  He stated that he had been  drinking heavily and weight had increased, he went cold turkey in May 2021 and the weight started coming off.  He said he was craving juice over the summer and noticed thirst, frequent urination and slow healing wounds.  He has been limiting carbohydrates at meals, he eliminated sugary beverages, he continues to not drink ETOH.  He does not have an exercise routine.         Patient's most recent   Lab Results   Component Value Date    A1C >14.0 08/29/2021    is not meeting goal of <7.0    PLAN  1. Keep lantus at 12 units for 9/8 and 9/9, if fasting BG is > 130 mg/dl on 9/10, increase Lantus to 14 units.  Continue to increase Lantus 2 units every three days until fasting blood sugar is below 130 mg/dl.  2. Test glucose 2-3 times daily: fasting and two hours after 1-2 meals.  3. Purchase glucose tablets.  4. If blood sugar is < 70 mg/dl, treat with 3-4 glucose tablets or 4 oz juice or regular soda, recheck blood sugar in 15 minutes, if still < 70 mg/dl, repeat steps.   5. Start exercising 3x/week for a minimum of 20-30 minutes, long term goal is to reach 150 minutes/week.  6. Keep carbohydrate intake no more than 60-75 grams per meals and 15-30 grams per snacks.   7. Call diabetes care team at [phone] for questions or concerns.  8. Follow up with Diabetes Educator on 9/15/21, 1 pm, telephone visit.    9. Educator will follow up with MD regarding lab work to rule out Type 1 Diabetes.     See Patient Instructions for co-developed, patient-stated behavior change goals.  AVS printed and provided to patient today. See Follow-Up section for recommended follow-up.      Time Spent: 60 minutes  Encounter Type: Individual    Any diabetes medication dose changes were made via the CDE Protocol and Collaborative Practice Agreement with the patient's primary care provider. A copy of this encounter was shared with the provider.

## 2021-09-08 NOTE — LETTER
"    9/8/2021         RE: Hieu Carson  [address] [facility] [address]        Dear Colleague,    Thank you for referring your patient, Hieu Carson, to the [facility]. Please see a copy of my visit note below.    Type of Service: Telephone Visit/ 60 minutes, patient was scheduled as a video visit, but he was not sent a link for the call.     How would patient like to obtain AVS? Mail a copy        Diabetes Self-Management Education & Support    Presents for: Initial Assessment for new diagnosis    SUBJECTIVE/OBJECTIVE:  Presents for: Initial Assessment for new diagnosis  Accompanied by: Self  Diabetes education in the past 24mo: No  Focus of Visit: Monitoring, Diabetes Pathophysiology, Taking Medication, Healthy Eating, Insulin Start  Diabetes type: Other (Per MD visit note it appeared that he would like to rule out type 1 DM)  Date of diagnosis: 8/29/21  Disease course:  (New Diagnosis)  Cultural Influences/Ethnic Background:  Not  or       Diabetes Symptoms & Complications:   Patient did report s/s of hyperglycemia: thirst, frequent urination and slow healing wounds.        Patient Problem List and Family Medical History reviewed for relevant medical history, current medical status, and diabetes risk factors.    Vitals:  There were no vitals taken for this visit.  Estimated body mass index is 22.7 kg/m  as calculated from the following:    Height as of 8/29/21: 1.88 m (6' 2\").    Weight as of 9/3/21: 80.2 kg (176 lb 12.8 oz).   Last 3 BP:   BP Readings from Last 3 Encounters:   09/03/21 112/74   08/29/21 115/75   08/29/21 109/62       History   Smoking Status     Current Every Day Smoker     Packs/day: 0.50     Years: 4.00   Smokeless Tobacco     Never Used       Labs:  Lab Results   Component Value Date    A1C >14.0 08/29/2021     Lab Results   Component Value Date     08/29/2021     10/20/2011     No results found for: LDL  No results found for: " HDL]  GFR Estimate   Date Value Ref Range Status   08/29/2021 >90 >60 mL/min/1.73m2 Final     Comment:     As of July 11, 2021, eGFR is calculated by the CKD-EPI creatinine equation, without race adjustment. eGFR can be influenced by muscle mass, exercise, and diet. The reported eGFR is an estimation only and is only applicable if the renal function is stable.   02/14/2020 >60 >60 mL/min/1.73m2 Final   10/20/2011 >90 >60 mL/min/1.7m2 Final     GFR Estimate If Black   Date Value Ref Range Status   02/14/2020 >60 >60 mL/min/1.73m2 Final   10/20/2011 >90 >60 mL/min/1.7m2 Final     Lab Results   Component Value Date    CR 0.75 08/29/2021    CR 0.80 10/20/2011     No results found for: MICROALBUMIN    Healthy Eating:  Meal planning/habits: Low carb, Avoiding sweets  Breakfast: 1 fried egg or an egg mcmuffin, coffee or low sugary energy drink  Lunch: chicken tenders, vegetables, corn, or lasagna or pasta or tacos(2),  Dinner: healthier, low carb microwave meals, or meat/vegetables,  Snacks: nuts or seeds or cheese, or fruit  Beverages: Water, Sports drinks    Being Active:  Being Active Assessed Today: Yes  Patient has not regular exercise routine. He will start walking on a regular basis.     Monitoring:  Monitoring Assessed Today: Yes  Blood Glucose Meter: Accu-chek    Patient has been checking his BG since 9/4/21 at various times during the day, but not specific to fasting or pre or post meal. He has noted blood glucose range from 160-312 mg/dl     Taking Medications:  Diabetes Medication(s)     Biguanides       metFORMIN (GLUCOPHAGE) 1000 MG tablet    Take 1 tablet (1,000 mg) by mouth 2 times daily (with meals)    Insulin       insulin glargine (LANTUS PEN) 100 UNIT/ML pen    Inject 10 Units Subcutaneous At Bedtime               Problem Solving:   discussion was held on s/s of hypo and hyperglycemia and treatment.               Reducing Risks:       Healthy Coping:     Patient Activation Measure Survey Score:  No  flowsheet data found.    Diabetes knowledge and skills assessment:   Patient is knowledgeable in diabetes management concepts related to: patient has been reading about Diabetes and is learning about physiology of DM, diet, benefits of exercise.     Patient needs further education on the following diabetes management concepts: Being Active, Monitoring, Taking Medication and Reducing Risks    Based on learning assessment above, most appropriate setting for further diabetes education would be: Individual setting.      INTERVENTIONS:    Education provided today on:  AADE Self-Care Behaviors:  Diabetes Pathophysiology  Healthy Eating: carbohydrate counting, consistency in amount, composition, and timing of food intake, label reading and weight gain  Being Active: relationship to blood glucose and describe appropriate activity program  Monitoring: log and interpret results, individual blood glucose targets and frequency of monitoring  Taking Medication: action of prescribed medication, drawing up, administering and storing injectable diabetes medications and proper site selection and rotation for injections  Problem Solving: high blood glucose - causes, signs/symptoms, treatment and prevention, low blood glucose - causes, signs/symptoms, treatment and prevention, carrying a carbohydrate source at all times and safe travel  Insulin administration technique taught today. Patient verbalized understanding and was able to perform an accurate return demonstration of administration technique.    Opportunities for ongoing education and support in diabetes-self management were discussed.    Pt verbalized understanding of concepts discussed and recommendations provided today.       Education Materials Provided:  Living Healthy with Diabetes, Carbohydrate Counting, Hypoglycemia Signs and Symptoms and My Plate Planner      ASSESSMENT:  Initial DM education today for Hieu who was recently diagnosed with Diabetes.  Patient is unsure  if he has Type 1 or Type 2 DM, no further testing had been done per patient.  He has been reading quite a bit about DM and making dietary change since his Diagnosis.  He reported a 60# weight loss since May 2021.  He stated that he had been drinking heavily and weight had increased, he went cold turkey in May 2021 and the weight started coming off.  He said he was craving juice over the summer and noticed thirst, frequent urination and slow healing wounds.  He has been limiting carbohydrates at meals, he eliminated sugary beverages, he continues to not drink ETOH.  He does not have an exercise routine.         Patient's most recent   Lab Results   Component Value Date    A1C >14.0 08/29/2021    is not meeting goal of <7.0    PLAN  1. Keep lantus at 12 units for 9/8 and 9/9, if fasting BG is > 130 mg/dl on 9/10, increase Lantus to 14 units.  Continue to increase Lantus 2 units every three days until fasting blood sugar is below 130 mg/dl.  2. Test glucose 2-3 times daily: fasting and two hours after 1-2 meals.  3. Purchase glucose tablets.  4. If blood sugar is < 70 mg/dl, treat with 3-4 glucose tablets or 4 oz juice or regular soda, recheck blood sugar in 15 minutes, if still < 70 mg/dl, repeat steps.   5. Start exercising 3x/week for a minimum of 20-30 minutes, long term goal is to reach 150 minutes/week.  6. Keep carbohydrate intake no more than 60-75 grams per meals and 15-30 grams per snacks.   7. Call diabetes care team at [phone] for questions or concerns.  8. Follow up with Diabetes Educator on 9/15/21, 1 pm, telephone visit.    9. Educator will follow up with MD regarding lab work to rule out Type 1 Diabetes.     See Patient Instructions for co-developed, patient-stated behavior change goals.  AVS printed and provided to patient today. See Follow-Up section for recommended follow-up.      Time Spent: 60 minutes  Encounter Type: Individual    Any diabetes medication dose changes were made via the CDE  Protocol and Collaborative Practice Agreement with the patient's primary care provider. A copy of this encounter was shared with the provider.

## 2021-09-08 NOTE — TELEPHONE ENCOUNTER
Message from pharmacy to verify the metformin dosage.    Script sent in to take 2 times a day and Quantitiy is #90 with 1 refill.  Please send new script.  ZOILA KOCH on 9/8/2021 at 7:27 AM

## 2021-09-15 ENCOUNTER — PATIENT OUTREACH (OUTPATIENT)
Dept: EDUCATION SERVICES | Facility: CLINIC | Age: 31
End: 2021-09-15
Payer: COMMERCIAL

## 2021-09-15 DIAGNOSIS — E11.9 TYPE 2 DIABETES MELLITUS WITHOUT COMPLICATION, WITHOUT LONG-TERM CURRENT USE OF INSULIN (H): Primary | ICD-10-CM

## 2021-09-15 PROCEDURE — G0108 DIAB MANAGE TRN  PER INDIV: HCPCS

## 2021-09-15 NOTE — PATIENT INSTRUCTIONS
1. Continue to increase lantus insulin 2 units every three days until fasting blood sugars are under 130 mg/dl.  2. If your blood sugar is below 70 mg/dl, treat with 4 glucose tablets or 4 oz of juice or regular soda, recheck glucose in 15 minutes, if it is still below 70 mg/dl, repeat steps.  3. Continue to walk a minimum of 30 minutes, 3x/week, as able, increase to 5x/week for 30 minutes.  4. Test glucose a minimum of twice daily: fasting and two hours after 1-2 meals/day.  5. Keep carbohydrates at no more than 60-75 grams per meals and no more than 15-30 grams for snacks.  6. Call and schedule a Diabetes eye exam and dental cleaning.  7. Call and schedule a follow up exam with Dr. Faulkner for the end of September/early October.   8. Follow up with educator the last week of September.

## 2021-09-15 NOTE — PROGRESS NOTES
"Type of Service: Telephone Visit/ 47 minutes    How would patient like to obtain AVS? Mail a copy     Diabetes Self-Management Education & Support    Presents for: Follow-up    SUBJECTIVE/OBJECTIVE:  Presents for: Follow-up  Accompanied by: Self  Diabetes education in the past 24mo: Yes  Focus of Visit: Monitoring, Taking Medication, Healthy Eating, Being Active, Reducing Risks  Diabetes type: Type 2  Date of diagnosis: 8/29/21  Disease course: Improving  Transportation concerns: No  Difficulty affording diabetes medication?: No  Difficulty affording diabetes testing supplies?: No  Other concerns:: None  Cultural Influences/Ethnic Background:  Not  or       Diabetes Symptoms & Complications:  Fatigue: No  Neuropathy: Yes (numbness in all ten toes)  Polydipsia: No  Polyphagia: No  Polyuria: No  Visual change: No  Slow healing wounds: No  Symptom course: Improving (patient reports of having more energy, less tired.)  Weight trend: Increasing (patient reported that his weight has increased 10# in the past two weeks)  Complications assessed today?: Yes  Peripheral neuropathy: Yes (patient reports of numbness in all ten toes)    Patient Problem List and Family Medical History reviewed for relevant medical history, current medical status, and diabetes risk factors.    Vitals:  There were no vitals taken for this visit.  Estimated body mass index is 22.7 kg/m  as calculated from the following:    Height as of 8/29/21: 1.88 m (6' 2\").    Weight as of 9/3/21: 80.2 kg (176 lb 12.8 oz).   Last 3 BP:   BP Readings from Last 3 Encounters:   09/03/21 112/74   08/29/21 115/75   08/29/21 109/62       History   Smoking Status     Current Every Day Smoker     Packs/day: 0.50     Years: 4.00   Smokeless Tobacco     Never Used       Labs:  Lab Results   Component Value Date    A1C >14.0 08/29/2021     Lab Results   Component Value Date     08/29/2021     10/20/2011     No results found for: LDL  No results " found for: HDL]  GFR Estimate   Date Value Ref Range Status   08/29/2021 >90 >60 mL/min/1.73m2 Final     Comment:     As of July 11, 2021, eGFR is calculated by the CKD-EPI creatinine equation, without race adjustment. eGFR can be influenced by muscle mass, exercise, and diet. The reported eGFR is an estimation only and is only applicable if the renal function is stable.   02/14/2020 >60 >60 mL/min/1.73m2 Final   10/20/2011 >90 >60 mL/min/1.7m2 Final     GFR Estimate If Black   Date Value Ref Range Status   02/14/2020 >60 >60 mL/min/1.73m2 Final   10/20/2011 >90 >60 mL/min/1.7m2 Final     Lab Results   Component Value Date    CR 0.75 08/29/2021    CR 0.80 10/20/2011     No results found for: MICROALBUMIN    Healthy Eating:  Healthy Eating Assessed Today: Yes  Meal planning/habits: Carb counting, Frequent snacking  Meals include: Breakfast, Lunch, Dinner, Morning Snack, Afternoon Snack, Evening Snack  Breakfast: fried egg or egg mcmuffin, coffee or low sugar enery drink( 25 cals/3 gm CHO)  Lunch: food in the cafe at work or grocery store: chicken tenders, vegetables, corn or lasagna or pasta or tacos, patient is considering starting to pack his lunch  Dinner: healthier low carb frozen meals, or meat/vegetables  Snacks: nuts or seeds or cheese or fruit  Beverages: Sports drinks, Water  Has patient met with a dietitian in the past?: Yes    Being Active:  Being Active Assessed Today: Yes  Exercise:: Yes  Days per week of moderate to strenuous exercise (like a brisk walk): 3  On average, minutes per day of exercise at this level: 30  How intense was your typical exercise? : Moderate (like brisk walking)  Exercise Minutes per Week: 90  Barrier to exercise: None    Monitoring:  Monitoring Assessed Today: Yes  Did patient bring glucose meter to appointment? :  (appointment was virtual, but patient shared his BG readings over the phone)  Blood Glucose Meter: Unknown  Times checking blood sugar at home (number): 2  Times  checking blood sugar at home (per): Day  Blood glucose trend: Decreasing    Blood glucose record since 9/8/21:  FBS: 189,181,179,179, 160 today  Post meal: 163,230,179,177,189,198,    Taking Medications:  Diabetes Medication(s)     Biguanides       metFORMIN (GLUCOPHAGE) 1000 MG tablet    Take 1 tablet (1,000 mg) by mouth 2 times daily (with meals)    Insulin       insulin glargine (LANTUS PEN) 100 UNIT/ML pen    Inject 10 Units Subcutaneous At Bedtime          Taking Medication Assessed Today: Yes  Current Treatments: Diet, Insulin Injections, Oral Medication (taken by mouth)  Dose schedule: At bedtime  Given by: Patient  Injection/Infusion sites: Abdomen  Problems taking diabetes medications regularly?: No  Diabetes medication side effects?: No    Problem Solving:  Problem Solving Assessed Today: Yes  Is the patient at risk for hypoglycemia?: Yes  Hypoglycemia Frequency:  (patient is on insulin, so he is at risk for low BG)  Hypoglycemia Treatment: Juice, Glucose (tablets or gel)  Does patient have glucagon emergency kit?: No  Does patient have sick day plan for diabetes management?: Yes (reviewed today)    Hypoglycemia symptoms  Confusion:  (no reports of low blood sugar or low blood sugar symtpons noted)         Reducing Risks:  Reducing Risks Assessed Today: Yes  Has dilated eye exam at least once a year?: No  Sees dentist every 6 months?: No    Healthy Coping:  Healthy Coping Assessed Today: Yes  Emotional response to diabetes: Acceptance, Concern for health and well-being  Informal Support system:: Family  Stage of change: ACTION (Actively working towards change)  Patient Activation Measure Survey Score:  No flowsheet data found.    Diabetes knowledge and skills assessment:   Patient is knowledgeable in diabetes management concepts related to: Healthy Eating, Being Active, Monitoring and Taking Medication    Patient needs further education on the following diabetes management concepts: Reducing Risks    Based  on learning assessment above, most appropriate setting for further diabetes education would be: Group class or Individual setting.      INTERVENTIONS:  Educator did send a message to PCP on 9/8/21 regarding lab work for ruling out Type 1 DM per MD discretion.  I have not heard back from MD as of today.     Education provided today on:  AADE Self-Care Behaviors:  Healthy Eating: carbohydrate counting and weight gain  Being Active: describe appropriate activity program and discussed adding in strengthening exercises  Monitoring: log and interpret results, individual blood glucose targets, frequency of monitoring and discussed looking into CGM  Taking Medication: proper site selection and rotation for injections and when to take medications  Reducing Risks: major complications of diabetes, prevention, early diagnostic measures and treatment of complications, foot care, appropriate dental care, annual eye exam, smoking cessation, A1C - goals, relating to blood glucose levels, how often to check and blood pressure and goals    Opportunities for ongoing education and support in diabetes-self management were discussed.    Pt verbalized understanding of concepts discussed and recommendations provided today.       Education Materials Provided:  Living Healthy with Diabetes and Carbohydrate Counting      ASSESSMENT:  Follow up appointment for Hieu to assess insulin titration/glucose levels. Patient has increased Lantus to 18 units as of 9/14 as FBS have remained above 130 mg/dl.  He will continue to titrate 2 units every three days until FBS are below 130 mg/dl.  He has added in walking 3x/week and continues to carb count for his meals and snacks. He is hydrated on water and other non sugary beverages. He has had no s/s of low blood sugar.  He states that he is feeling less fatigued and has more energy. Weight has increased 10# in the past two weeks, current weight is 182# which feels comfortable. He would like to start  adding in strengthening exercises again to build muscle. Patient is smoking 1/2 ppd.  He has not had a recent eye or dental exam.         Patient's most recent   Lab Results   Component Value Date    A1C >14.0 08/29/2021    is not meeting goal of <7.0    PLAN  Educator will update MD on DM visit today and consult on labs to rule out Type 1 Diabetes per MD discretion.      1. Continue to increase lantus insulin 2 units every three days until fasting blood sugars are under 130 mg/dl.  2. If your blood sugar is below 70 mg/dl, treat with 4 glucose tablets or 4 oz of juice or regular soda, recheck glucose in 15 minutes, if it is still below 70 mg/dl, repeat steps.  3. Continue to walk a minimum of 30 minutes, 3x/week, as able, increase to 5x/week for 30 minutes.  4. Test glucose a minimum of twice daily: fasting and two hours after 1-2 meals/day.  5. Keep carbohydrates at no more than 60-75 grams per meals and no more than 15-30 grams for snacks.  6. Call and schedule a Diabetes eye exam and dental cleaning.  7. Call and schedule a follow up exam with Dr. Faulkner for the end of September/early October.   8. Follow up with educator the last week of September    See Patient Instructions for co-developed, patient-stated behavior change goals.  AVS printed and provided to patient today. See Follow-Up section for recommended follow-up.      Time Spent: 47 minutes  Encounter Type: Individual    Any diabetes medication dose changes were made via the CDE Protocol and Collaborative Practice Agreement with the patient's referring provider. A copy of this encounter was shared with the provider.

## 2021-09-20 NOTE — TELEPHONE ENCOUNTER
Last annual was on 05/27/2021.   Patient reports a weight loss of 60 lbs after he stopped drinking.  He tested his blood sugar on Thurs and it was 400 - in the morning prior to eating. Patient report he has an appointment next Friday but wanted to talk to a nurse to see if he should wait that long.    Disposition: be seen within 24 hrs  Reviewed care advice with caller per RN triage protocol guideline.  Advised to call back with worsening symptoms, concerns or questions. Caller verbalized understanding.  Patient will go to [facility] tomorrow morning.        Reason for Disposition    New onset diabetes suspected (e.g., frequent urination, weakness, weight loss)    Additional Information    Negative: Unconscious or difficult to awaken    Negative: Acting confused (e.g., disoriented, slurred speech)    Negative: Very weak (e.g., can't stand)    Negative: Sounds like a life-threatening emergency to the triager    Negative: [1] Vomiting AND [2] signs of dehydration (e.g., very dry mouth, lightheaded, dark urine)    Negative: [1] Blood glucose > 240 mg/dL (13.3 mmol/L) AND [2] rapid breathing    Negative: [1] New onset diabetes suspected (e.g., frequent urination, weak, weight loss) AND [2] vomiting or rapid breathing    Negative: [1] Blood glucose > 240 mg/dL (13.3 mmol/L) AND [2] vomiting AND [3] unable to check for ketones (in blood or urine)    Negative: [1] Blood glucose > 240 mg/dL (13.3 mmol/L) AND [2] blood ketones > 1.4 mmol/L    Negative: [1] Blood glucose > 240 mg/dL (13.3 mmol/L) AND [2] urine ketones moderate-large (or more than 1+)    Negative: Blood glucose > 500 mg/dL (27.8 mmol/L)    Negative: Vomiting lasts > 4 hours    Negative: Patient sounds very sick or weak to the triager    Negative: Fever > 100.4 F (38.0 C)    Negative: Blood glucose > 400 mg/dL (22.2 mmol/L)    Negative: [1] Blood glucose > 300 mg/dL (16.7 mmol/L) AND [2] two or more times in a row    Negative: Urine ketones moderate - large (or blood ketones > 1.4  mmol/L)    Negative: [1] Caller has URGENT medication or insulin pump question AND [2] triager unable to answer question    Negative: [1] Symptoms of high blood sugar (e.g., frequent urination, weak, weight loss) AND [2] not able to test blood glucose    Protocols used: DIABETES - HIGH BLOOD SUGAR-A-

## 2021-09-28 ENCOUNTER — VIRTUAL VISIT (OUTPATIENT)
Dept: EDUCATION SERVICES | Facility: CLINIC | Age: 31
End: 2021-09-28
Payer: COMMERCIAL

## 2021-09-28 DIAGNOSIS — E11.9 TYPE 2 DIABETES MELLITUS WITHOUT COMPLICATION, WITHOUT LONG-TERM CURRENT USE OF INSULIN (H): Primary | ICD-10-CM

## 2021-09-28 PROCEDURE — G0108 DIAB MANAGE TRN  PER INDIV: HCPCS | Performed by: DIETITIAN, REGISTERED

## 2021-09-28 NOTE — PATIENT INSTRUCTIONS
1. Administer Lantus 20 units, if more than 50% of your fasting glucose readings are greater than 130 mg/dl, increase to 22 units.   2. Check fasting and post meal glucose daily.  3. If your blood glucose is less than 70 mg/dl, have 4 oz juice or regular soda, recheck glucose in 15 minutes, if still less than 70 mg/dl, repeat steps.  4. Continue to walk 30 minutes, 3x/week, add in additional days to reach 150 minutes/week.  5. Follow up with Dr. Faulkner  6. Schedule dental and eye exam.  7. Follow up with Diabetes Educator on 11/9/21

## 2021-09-28 NOTE — LETTER
"    9/28/2021         RE: Hieu Carson  [address] [facility] [id number]        Dear Colleague,    Thank you for referring your patient, Hieu Carson, to the [facility]. Please see a copy of my visit note below.    Type of Service: Telephone Visit/ 30 minutes    How would patient like to obtain AVS? MyChart     Diabetes Self-Management Education & Support    Presents for: Follow-up    SUBJECTIVE/OBJECTIVE:  Presents for: Follow-up  Accompanied by: Self  Diabetes education in the past 24mo: Yes  Focus of Visit: Monitoring, Reducing Risks, Taking Medication, Healthy Eating, Being Active  Diabetes type: Type 2  Date of diagnosis: 8/29/21  Disease course: Improving  Transportation concerns: No  Difficulty affording diabetes medication?: No  Difficulty affording diabetes testing supplies?: No  Other concerns:: None  Cultural Influences/Ethnic Background:  Not  or       Diabetes Symptoms & Complications:  Weight trend: Increasing (patient reported an additional weight gain of 5-8#)  Complications assessed today?: No    Patient Problem List and Family Medical History reviewed for relevant medical history, current medical status, and diabetes risk factors.    Vitals:  There were no vitals taken for this visit.  Estimated body mass index is 22.7 kg/m  as calculated from the following:    Height as of 8/29/21: 1.88 m (6' 2\").    Weight as of 9/3/21: 80.2 kg (176 lb 12.8 oz).   Last 3 BP:   BP Readings from Last 3 Encounters:   09/03/21 112/74   08/29/21 115/75   08/29/21 109/62       History   Smoking Status     Current Every Day Smoker     Packs/day: 0.50     Years: 4.00   Smokeless Tobacco     Never Used       Labs:  Lab Results   Component Value Date    A1C >14.0 08/29/2021     Lab Results   Component Value Date     08/29/2021     10/20/2011     No results found for: LDL  No results found for: HDL]  GFR Estimate   Date Value Ref Range Status   08/29/2021 >90 " >60 mL/min/1.73m2 Final     Comment:     As of July 11, 2021, eGFR is calculated by the CKD-EPI creatinine equation, without race adjustment. eGFR can be influenced by muscle mass, exercise, and diet. The reported eGFR is an estimation only and is only applicable if the renal function is stable.   02/14/2020 >60 >60 mL/min/1.73m2 Final   10/20/2011 >90 >60 mL/min/1.7m2 Final     GFR Estimate If Black   Date Value Ref Range Status   02/14/2020 >60 >60 mL/min/1.73m2 Final   10/20/2011 >90 >60 mL/min/1.7m2 Final     Lab Results   Component Value Date    CR 0.75 08/29/2021    CR 0.80 10/20/2011     No results found for: MICROALBUMIN    Healthy Eating:  Healthy Eating Assessed Today: Yes (brief overview on estimated CHO intake/snacks today)  Meal planning/habits: Carb counting, Frequent snacking  Meals include: Breakfast, Lunch, Dinner, Afternoon Snack, Evening Snack  Beverages: Water, Other    Being Active:  Being Active Assessed Today: Yes  Exercise:: Yes (patient is walking 30 minutes 3x/week, plus is active during his work day)  Days per week of moderate to strenuous exercise (like a brisk walk): 3  On average, minutes per day of exercise at this level: 30  How intense was your typical exercise? : Moderate (like brisk walking)  Exercise Minutes per Week: 90  Barrier to exercise: None    Monitoring:  Monitoring Assessed Today: Yes  Did patient bring glucose meter to appointment? : No (BG readings given were per recall)  Blood Glucose Meter: Unknown  Times checking blood sugar at home (number): 1  Times checking blood sugar at home (per): Day  Blood glucose trend: Decreasing    Blood glucose record per patient's recall  FBS: 120-139 since increasing Lantus to 20 units  PP: 135-140    Taking Medications:  Diabetes Medication(s)     Biguanides       metFORMIN (GLUCOPHAGE) 1000 MG tablet    Take 1 tablet (1,000 mg) by mouth 2 times daily (with meals)    Insulin       insulin glargine (LANTUS PEN) 100 UNIT/ML pen    Inject  20 Units Subcutaneous At Bedtime          Taking Medication Assessed Today: Yes  Current Treatments: Diet, Insulin Injections  Dose schedule: At bedtime  Given by: Patient  Injection/Infusion sites: Abdomen  Problems taking diabetes medications regularly?: No (patient reported that he stopped taking Metformin)  Diabetes medication side effects?: No    Problem Solving:  Problem Solving Assessed Today: Yes (discussed s/s of low blood sugar, testing BG before driving, benefits of using a CGM system)  Is the patient at risk for hypoglycemia?: Yes (patient is on insulin, so is at risk of hypoglycemia)  Hypoglycemia Frequency: Never  Hypoglycemia Treatment: Juice              Reducing Risks:  Has dilated eye exam at least once a year?: No (patient needs to schedule an appointment, waiting for insurance change 11/1/21)  Sees dentist every 6 months?: No (patient needs to schedule, he is waiting for new insurance coverage starting in november 2021)    Healthy Coping:  Emotional response to diabetes: Acceptance, Concern for health and well-being  Informal Support system:: Family  Stage of change: ACTION (Actively working towards change)  Patient Activation Measure Survey Score:  No flowsheet data found.    Diabetes knowledge and skills assessment:   Patient is knowledgeable in diabetes management concepts related to: Healthy Eating and Being Active    Patient needs further education on the following diabetes management concepts: Taking Medication, Problem Solving and Reducing Risks    Based on learning assessment above, most appropriate setting for further diabetes education would be: Group class or Individual setting.      INTERVENTIONS:    Education provided today on:  AADE Self-Care Behaviors:  Healthy Eating: carbohydrate counting and weight gain  Being Active: relationship to blood glucose and describe appropriate activity program  Monitoring: individual blood glucose targets, frequency of monitoring and discussed CGM  system  Taking Medication: action of prescribed medication and dose of Lantus, titration  Problem Solving: low blood glucose - causes, signs/symptoms, treatment and prevention and safe travel  Reducing Risks: appropriate dental care and annual eye exam    Opportunities for ongoing education and support in diabetes-self management were discussed.    Pt verbalized understanding of concepts discussed and recommendations provided today.       Education Materials Provided:  No new materials provided today      ASSESSMENT:  Follow up visit for Hieu today.  He has been walking after work 30 minutes, 3x/week, no strengthening exercises added at this time.  He reported weight increase of 5-8#.  He reported that he had stopped taking his Metformin medication.  He is checking fasting glucose consistently, he has increased lantus to 20 units.  He is planning on following up with MD regarding labwork to rule out Type 1 DM and A1C follow up.  He reported one BG reading of 112 mg/dl, but no BG < 70 mg/dl. He is waiting for a potential insurance change early November and then will schedule eye and dental exams.          Patient's most recent   Lab Results   Component Value Date    A1C >14.0 08/29/2021    is not meeting goal of <7.0    PLAN  1. Administer Lantus 20 units, if more than 50% of your fasting glucose readings are greater than 130 mg/dl, increase to 22 units.   2. Check fasting and post meal glucose daily.  3. If your blood glucose is less than 70 mg/dl, have 4 oz juice or regular soda, recheck glucose in 15 minutes, if still less than 70 mg/dl, repeat steps.  4. Continue to walk 30 minutes, 3x/week, add in additional days to reach 150 minutes/week.  5. Follow up with Dr. Faulkner  6. Schedule dental and eye exam.  7. Follow up with Diabetes Educator on 11/9/21    See Patient Instructions for co-developed, patient-stated behavior change goals.  AVS printed and provided to patient today. See Follow-Up section for recommended  follow-up.      Time Spent: 30 minutes  Encounter Type: Individual    Any diabetes medication dose changes were made via the CDE Protocol and Collaborative Practice Agreement with the patient's primary care provider. A copy of this encounter was shared with the provider.

## 2021-09-28 NOTE — PROGRESS NOTES
"Type of Service: Telephone Visit/ 30 minutes    How would patient like to obtain AVS? [facility]     Diabetes Self-Management Education & Support    Presents for: Follow-up    SUBJECTIVE/OBJECTIVE:  Presents for: Follow-up  Accompanied by: Self  Diabetes education in the past 24mo: Yes  Focus of Visit: Monitoring, Reducing Risks, Taking Medication, Healthy Eating, Being Active  Diabetes type: Type 2  Date of diagnosis: 8/29/21  Disease course: Improving  Transportation concerns: No  Difficulty affording diabetes medication?: No  Difficulty affording diabetes testing supplies?: No  Other concerns:: None  Cultural Influences/Ethnic Background:  Not  or       Diabetes Symptoms & Complications:  Weight trend: Increasing (patient reported an additional weight gain of 5-8#)  Complications assessed today?: No    Patient Problem List and Family Medical History reviewed for relevant medical history, current medical status, and diabetes risk factors.    Vitals:  There were no vitals taken for this visit.  Estimated body mass index is 22.7 kg/m  as calculated from the following:    Height as of 8/29/21: 1.88 m (6' 2\").    Weight as of 9/3/21: 80.2 kg (176 lb 12.8 oz).   Last 3 BP:   BP Readings from Last 3 Encounters:   09/03/21 112/74   08/29/21 115/75   08/29/21 109/62       History   Smoking Status     Current Every Day Smoker     Packs/day: 0.50     Years: 4.00   Smokeless Tobacco     Never Used       Labs:  Lab Results   Component Value Date    A1C >14.0 08/29/2021     Lab Results   Component Value Date     08/29/2021     10/20/2011     No results found for: LDL  No results found for: HDL]  GFR Estimate   Date Value Ref Range Status   08/29/2021 >90 >60 mL/min/1.73m2 Final     Comment:     As of July 11, 2021, eGFR is calculated by the CKD-EPI creatinine equation, without race adjustment. eGFR can be influenced by muscle mass, exercise, and diet. The reported eGFR is an estimation only and is only " applicable if the renal function is stable.   02/14/2020 >60 >60 mL/min/1.73m2 Final   10/20/2011 >90 >60 mL/min/1.7m2 Final     GFR Estimate If Black   Date Value Ref Range Status   02/14/2020 >60 >60 mL/min/1.73m2 Final   10/20/2011 >90 >60 mL/min/1.7m2 Final     Lab Results   Component Value Date    CR 0.75 08/29/2021    CR 0.80 10/20/2011     No results found for: MICROALBUMIN    Healthy Eating:  Healthy Eating Assessed Today: Yes (brief overview on estimated CHO intake/snacks today)  Meal planning/habits: Carb counting, Frequent snacking  Meals include: Breakfast, Lunch, Dinner, Afternoon Snack, Evening Snack  Beverages: Water, Other    Being Active:  Being Active Assessed Today: Yes  Exercise:: Yes (patient is walking 30 minutes 3x/week, plus is active during his work day)  Days per week of moderate to strenuous exercise (like a brisk walk): 3  On average, minutes per day of exercise at this level: 30  How intense was your typical exercise? : Moderate (like brisk walking)  Exercise Minutes per Week: 90  Barrier to exercise: None    Monitoring:  Monitoring Assessed Today: Yes  Did patient bring glucose meter to appointment? : No (BG readings given were per recall)  Blood Glucose Meter: Unknown  Times checking blood sugar at home (number): 1  Times checking blood sugar at home (per): Day  Blood glucose trend: Decreasing    Blood glucose record per patient's recall  FBS: 120-139 since increasing Lantus to 20 units  PP: 135-140    Taking Medications:  Diabetes Medication(s)     Biguanides       metFORMIN (GLUCOPHAGE) 1000 MG tablet    Take 1 tablet (1,000 mg) by mouth 2 times daily (with meals)    Insulin       insulin glargine (LANTUS PEN) 100 UNIT/ML pen    Inject 20 Units Subcutaneous At Bedtime          Taking Medication Assessed Today: Yes  Current Treatments: Diet, Insulin Injections  Dose schedule: At bedtime  Given by: Patient  Injection/Infusion sites: Abdomen  Problems taking diabetes medications  regularly?: No (patient reported that he stopped taking Metformin)  Diabetes medication side effects?: No    Problem Solving:  Problem Solving Assessed Today: Yes (discussed s/s of low blood sugar, testing BG before driving, benefits of using a CGM system)  Is the patient at risk for hypoglycemia?: Yes (patient is on insulin, so is at risk of hypoglycemia)  Hypoglycemia Frequency: Never  Hypoglycemia Treatment: Juice              Reducing Risks:  Has dilated eye exam at least once a year?: No (patient needs to schedule an appointment, waiting for insurance change 11/1/21)  Sees dentist every 6 months?: No (patient needs to schedule, he is waiting for new insurance coverage starting in november 2021)    Healthy Coping:  Emotional response to diabetes: Acceptance, Concern for health and well-being  Informal Support system:: Family  Stage of change: ACTION (Actively working towards change)  Patient Activation Measure Survey Score:  No flowsheet data found.    Diabetes knowledge and skills assessment:   Patient is knowledgeable in diabetes management concepts related to: Healthy Eating and Being Active    Patient needs further education on the following diabetes management concepts: Taking Medication, Problem Solving and Reducing Risks    Based on learning assessment above, most appropriate setting for further diabetes education would be: Group class or Individual setting.      INTERVENTIONS:    Education provided today on:  AADE Self-Care Behaviors:  Healthy Eating: carbohydrate counting and weight gain  Being Active: relationship to blood glucose and describe appropriate activity program  Monitoring: individual blood glucose targets, frequency of monitoring and discussed CGM system  Taking Medication: action of prescribed medication and dose of Lantus, titration  Problem Solving: low blood glucose - causes, signs/symptoms, treatment and prevention and safe travel  Reducing Risks: appropriate dental care and annual eye  exam    Opportunities for ongoing education and support in diabetes-self management were discussed.    Pt verbalized understanding of concepts discussed and recommendations provided today.       Education Materials Provided:  No new materials provided today      ASSESSMENT:  Follow up visit for Hieu today.  He has been walking after work 30 minutes, 3x/week, no strengthening exercises added at this time.  He reported weight increase of 5-8#.  He reported that he had stopped taking his Metformin medication.  He is checking fasting glucose consistently, he has increased lantus to 20 units.  He is planning on following up with MD regarding labwork to rule out Type 1 DM and A1C follow up.  He reported one BG reading of 112 mg/dl, but no BG < 70 mg/dl. He is waiting for a potential insurance change early November and then will schedule eye and dental exams.          Patient's most recent   Lab Results   Component Value Date    A1C >14.0 08/29/2021    is not meeting goal of <7.0    PLAN  1. Administer Lantus 20 units, if more than 50% of your fasting glucose readings are greater than 130 mg/dl, increase to 22 units.   2. Check fasting and post meal glucose daily.  3. If your blood glucose is less than 70 mg/dl, have 4 oz juice or regular soda, recheck glucose in 15 minutes, if still less than 70 mg/dl, repeat steps.  4. Continue to walk 30 minutes, 3x/week, add in additional days to reach 150 minutes/week.  5. Follow up with Dr. Faulkner  6. Schedule dental and eye exam.  7. Follow up with Diabetes Educator on 11/9/21    See Patient Instructions for co-developed, patient-stated behavior change goals.  AVS printed and provided to patient today. See Follow-Up section for recommended follow-up.      Time Spent: 30 minutes  Encounter Type: Individual    Any diabetes medication dose changes were made via the CDE Protocol and Collaborative Practice Agreement with the patient's primary care provider. A copy of this encounter was  shared with the provider.

## 2021-10-02 ENCOUNTER — HEALTH MAINTENANCE LETTER (OUTPATIENT)
Age: 31
End: 2021-10-02

## 2021-10-08 DIAGNOSIS — E11.9 TYPE 2 DIABETES MELLITUS WITHOUT COMPLICATION, WITHOUT LONG-TERM CURRENT USE OF INSULIN (H): ICD-10-CM

## 2021-10-08 RX ORDER — DISULFIRAM 250 MG/1
TABLET ORAL
COMMUNITY
Start: 2021-09-26 | End: 2023-03-14

## 2021-10-08 RX ORDER — LANCETS
EACH MISCELLANEOUS
COMMUNITY
Start: 2021-09-07 | End: 2023-03-14

## 2021-10-11 NOTE — TELEPHONE ENCOUNTER
Please inform the patient that the medication has been refilled as far back as 3 days ago.  He can call the pharmacy to pick it up.

## 2021-10-12 ENCOUNTER — TELEPHONE (OUTPATIENT)
Dept: FAMILY MEDICINE | Facility: CLINIC | Age: 31
End: 2021-10-12

## 2021-10-12 NOTE — TELEPHONE ENCOUNTER
Pt had scheduled via [facility] lab only appt.  There are no orders from pt PCP in chart.  Pt advised to f/up with PCP who can order labs at the time of the visit.  Lab only apt for tomorrow at [facility] canceled.  Pt understands and will call for apt with PCP in [place].  Thanks

## 2022-01-22 ENCOUNTER — HEALTH MAINTENANCE LETTER (OUTPATIENT)
Age: 32
End: 2022-01-22

## 2022-05-10 ENCOUNTER — MYC MEDICAL ADVICE (OUTPATIENT)
Dept: FAMILY MEDICINE | Facility: CLINIC | Age: 32
End: 2022-05-10
Payer: COMMERCIAL

## 2022-05-14 ENCOUNTER — HEALTH MAINTENANCE LETTER (OUTPATIENT)
Age: 32
End: 2022-05-14

## 2022-08-31 DIAGNOSIS — E11.9 TYPE 2 DIABETES MELLITUS WITHOUT COMPLICATION, WITHOUT LONG-TERM CURRENT USE OF INSULIN (H): ICD-10-CM

## 2022-09-01 NOTE — TELEPHONE ENCOUNTER
"Former patient of Kaushik & has not established care with another provider.  Please assign refill request to covering provider per clinic standard process.    Routing refill request to provider for review/approval because:  Labs not current:  Multiple  No PCP    Last Written Prescription Date:  7/29/22  Last Fill Quantity: 15 ml,  # refills: 0   Last office visit provider:  9/3/21     Requested Prescriptions   Pending Prescriptions Disp Refills     insulin glargine (LANTUS SOLOSTAR) 100 UNIT/ML pen 15 mL 0       Long Acting Insulin Protocol Failed - 9/1/2022  7:20 AM        Failed - Serum creatinine on file in past 12 months     Recent Labs   Lab Test 08/29/21  1758   CR 0.75       Ok to refill medication if creatinine is low          Failed - HgbA1C in past 3 or 6 months     If HgbA1C is 8 or greater, it needs to be on file within the past 3 months.  If less than 8, must be on file within the past 6 months.     Recent Labs   Lab Test 08/29/21  1338   A1C >14.0*             Failed - Recent (6 mo) or future (30 days) visit within the authorizing provider's specialty     Patient had office visit in the last 6 months or has a visit in the next 30 days with authorizing provider or within the authorizing provider's specialty.  See \"Patient Info\" tab in inbasket, or \"Choose Columns\" in Meds & Orders section of the refill encounter.            Passed - Medication is active on med list        Passed - Patient is age 18 or older             Aneudy Constantino RN 09/01/22 7:21 AM  "

## 2022-09-03 ENCOUNTER — HEALTH MAINTENANCE LETTER (OUTPATIENT)
Age: 32
End: 2022-09-03

## 2022-09-06 RX ORDER — INSULIN GLARGINE 100 [IU]/ML
INJECTION, SOLUTION SUBCUTANEOUS
Qty: 15 ML | Refills: 1 | Status: SHIPPED | OUTPATIENT
Start: 2022-09-06 | End: 2023-03-14

## 2022-09-07 NOTE — TELEPHONE ENCOUNTER
9-7-22   pt needs an appt:  unfortunately this would have to be at a different location other than [facility], here at [facility] our remaining provider panels are now closed which means they are no longer seeing any more patients than what is already assigned to them.

## 2023-01-14 ENCOUNTER — HEALTH MAINTENANCE LETTER (OUTPATIENT)
Age: 33
End: 2023-01-14

## 2023-03-14 ENCOUNTER — OFFICE VISIT (OUTPATIENT)
Dept: FAMILY MEDICINE | Facility: CLINIC | Age: 33
End: 2023-03-14
Payer: COMMERCIAL

## 2023-03-14 DIAGNOSIS — Z13.220 SCREENING FOR HYPERLIPIDEMIA: ICD-10-CM

## 2023-03-14 DIAGNOSIS — Z79.4 TYPE 2 DIABETES MELLITUS WITH DIABETIC NEUROPATHY, WITH LONG-TERM CURRENT USE OF INSULIN (H): Primary | ICD-10-CM

## 2023-03-14 DIAGNOSIS — F11.21 OPIOID DEPENDENCE IN REMISSION (H): ICD-10-CM

## 2023-03-14 DIAGNOSIS — E11.40 TYPE 2 DIABETES MELLITUS WITH DIABETIC NEUROPATHY, WITH LONG-TERM CURRENT USE OF INSULIN (H): Primary | ICD-10-CM

## 2023-03-14 DIAGNOSIS — I10 BENIGN ESSENTIAL HYPERTENSION: ICD-10-CM

## 2023-03-14 DIAGNOSIS — F17.200 TOBACCO USE DISORDER: ICD-10-CM

## 2023-03-14 LAB
ALBUMIN SERPL BCG-MCNC: 5.4 G/DL (ref 3.5–5.2)
ALP SERPL-CCNC: 75 U/L (ref 40–129)
ALT SERPL W P-5'-P-CCNC: 136 U/L (ref 10–50)
ANION GAP SERPL CALCULATED.3IONS-SCNC: 16 MMOL/L (ref 7–15)
AST SERPL W P-5'-P-CCNC: 123 U/L (ref 10–50)
BILIRUB SERPL-MCNC: 1 MG/DL
BUN SERPL-MCNC: 11.1 MG/DL (ref 6–20)
CALCIUM SERPL-MCNC: 10.2 MG/DL (ref 8.6–10)
CHLORIDE SERPL-SCNC: 102 MMOL/L (ref 98–107)
CHOLEST SERPL-MCNC: 187 MG/DL
CREAT SERPL-MCNC: 0.57 MG/DL (ref 0.67–1.17)
DEPRECATED HCO3 PLAS-SCNC: 23 MMOL/L (ref 22–29)
GFR SERPL CREATININE-BSD FRML MDRD: >90 ML/MIN/1.73M2
GLUCOSE SERPL-MCNC: 151 MG/DL (ref 70–99)
HBA1C MFR BLD: 6.3 % (ref 0–5.6)
HDLC SERPL-MCNC: 41 MG/DL
LDLC SERPL CALC-MCNC: 120 MG/DL
NONHDLC SERPL-MCNC: 146 MG/DL
POTASSIUM SERPL-SCNC: 4 MMOL/L (ref 3.4–5.3)
PROT SERPL-MCNC: 8.2 G/DL (ref 6.4–8.3)
SODIUM SERPL-SCNC: 141 MMOL/L (ref 136–145)
TRIGL SERPL-MCNC: 131 MG/DL
TSH SERPL DL<=0.005 MIU/L-ACNC: 1.54 UIU/ML (ref 0.3–4.2)

## 2023-03-14 PROCEDURE — 84443 ASSAY THYROID STIM HORMONE: CPT | Performed by: NURSE PRACTITIONER

## 2023-03-14 PROCEDURE — 36415 COLL VENOUS BLD VENIPUNCTURE: CPT | Performed by: NURSE PRACTITIONER

## 2023-03-14 PROCEDURE — 90471 IMMUNIZATION ADMIN: CPT | Performed by: NURSE PRACTITIONER

## 2023-03-14 PROCEDURE — 99214 OFFICE O/P EST MOD 30 MIN: CPT | Mod: 25 | Performed by: NURSE PRACTITIONER

## 2023-03-14 PROCEDURE — 80061 LIPID PANEL: CPT | Performed by: NURSE PRACTITIONER

## 2023-03-14 PROCEDURE — 80053 COMPREHEN METABOLIC PANEL: CPT | Performed by: NURSE PRACTITIONER

## 2023-03-14 PROCEDURE — 90677 PCV20 VACCINE IM: CPT | Performed by: NURSE PRACTITIONER

## 2023-03-14 PROCEDURE — 83036 HEMOGLOBIN GLYCOSYLATED A1C: CPT | Performed by: NURSE PRACTITIONER

## 2023-03-14 RX ORDER — INSULIN GLARGINE-YFGN 100 [IU]/ML
23 INJECTION, SOLUTION SUBCUTANEOUS AT BEDTIME
Qty: 9 ML | Refills: 1 | Status: SHIPPED | OUTPATIENT
Start: 2023-03-14 | End: 2023-06-16

## 2023-03-14 RX ORDER — LISINOPRIL 5 MG/1
5 TABLET ORAL DAILY
Qty: 60 TABLET | Refills: 0 | Status: SHIPPED | OUTPATIENT
Start: 2023-03-14 | End: 2023-05-11

## 2023-03-14 ASSESSMENT — PATIENT HEALTH QUESTIONNAIRE - PHQ9
10. IF YOU CHECKED OFF ANY PROBLEMS, HOW DIFFICULT HAVE THESE PROBLEMS MADE IT FOR YOU TO DO YOUR WORK, TAKE CARE OF THINGS AT HOME, OR GET ALONG WITH OTHER PEOPLE: NOT DIFFICULT AT ALL
SUM OF ALL RESPONSES TO PHQ QUESTIONS 1-9: 2
SUM OF ALL RESPONSES TO PHQ QUESTIONS 1-9: 2

## 2023-03-14 ASSESSMENT — PAIN SCALES - GENERAL: PAINLEVEL: NO PAIN (0)

## 2023-03-14 NOTE — PATIENT INSTRUCTIONS
Labs today.  Start lisinopril 5 mg and take BP daily at home or make nursing appointment here in 1 week for blood pressure check.  If elevated above 140/90, increase dose to 10 mg (2 tabs) and recheck again in a week or take daily BP's and follow-up if still over 140/90 with a phone call.  Follow-up in 1 month for recheck BP and lab.  Meds refilled for diabetes and supplies.  Follow-up pending your A1C and labs.

## 2023-03-14 NOTE — PROGRESS NOTES
Assessment & Plan     Type 2 diabetes mellitus with diabetic neuropathy, with long-term current use of insulin (H)  Patient is overdue for diabetes follow-up, has not come in for recommended monitoring of Hgb A1C. Will recheck Hgb A1C today to assess for any improvement on daily 23 units insulin glargine use. Will also check lipid panel for increased CV risk, urine albumin and CMP to monitor kidney function and liver funsction as well due to hx of drug use. Will check TSH for any concommittent thyroid disease as this has not yet been evaluated. Will also start patient on lisinopril for kidney protective effect in diabetics and blood pressure control. Refilled insulin glargine and diabetes supplies below. Reinforced the importance of ongoing close management of diabetes. Patient given strict instructions to return to clinic in one month for BP check/labs and likely in 3 months for ongoing diabetes management pending lab results.   - Lipid panel reflex to direct LDL Non-fasting; Future  - Albumin Random Urine Quantitative with Creat Ratio; Future  - HEMOGLOBIN A1C; Future  - Comprehensive metabolic panel (BMP + Alb, Alk Phos, ALT, AST, Total. Bili, TP); Future  - TSH with free T4 reflex; Future  - lisinopril (ZESTRIL) 5 MG tablet; Take 1 tablet (5 mg) by mouth daily  - Insulin Glargine-yfgn 100 UNIT/ML SOPN; Inject 23 Units Subcutaneous At Bedtime  - insulin pen needle (32G X 4 MM) 32G X 4 MM miscellaneous; Use insulin pen needles daily or as directed.  - blood glucose (NO BRAND SPECIFIED) test strip; Use to test blood sugar 2 times daily or as directed.  - blood glucose (NO BRAND SPECIFIED) lancets standard; Use to test blood sugar 2  times daily or as directed.    Screening for hyperlipidemia  - Lipid panel reflex to direct LDL Non-fasting; Future    Opioid dependence in remission (H)  Sober for past 10 years (last use in 2013 per patient). Stable on suboxone therapy. Will check CMP to monitor liver function given  drug use hx.   - Comprehensive metabolic panel (BMP + Alb, Alk Phos, ALT, AST, Total. Bili, TP); Future    Tobacco use disorder  17 pack year hx.  Explained the increased risk of CV disease given DM2 and smoking. Patient did not endorse desire to quit today. Will reassess at future visits.    Benign essential hypertension  BP elevated above 140/90 today on recheck in clinic. Will start patient on 5 mg lisinopril given hx of DM2. Patient told to monitor his BP daily at home and increase dose to 10 mg daily if BP remains elevated over 140/90 after one week. Then told to follow up with phone call to clinic if not controlled after one week of taking 10 mg daily.   - lisinopril (ZESTRIL) 5 MG tablet; Take 1 tablet (5 mg) by mouth daily    Nicotine/Tobacco Cessation:  He reports that he has been smoking cigarettes. He has a 17.00 pack-year smoking history. He has never used smokeless tobacco.  Nicotine/Tobacco Cessation Plan:   Information offered: Patient not interested at this time      See Patient Instructions    Return in about 1 month (around 4/14/2023) for Follow up.    Note by: Taylor Dos Santos NP student  Yuim Hooks NP  [facility]    Maurilio Hagen is a 32 year old, presenting for the following health issues:  Diabetes      History of Present Illness       Diabetes:   He presents for follow up of diabetes.  He is checking home blood glucose two times daily. He checks blood glucose before meals and before and after meals.  Blood glucose is never over 200 and never under 70. When his blood glucose is low, the patient is asymptomatic for confusion, blurred vision, lethargy and reports not feeling dizzy, shaky, or weak.  He has no concerns regarding his diabetes at this time.  He is having numbness in feet. The patient has not had a diabetic eye exam in the last 12 months.         He eats 0-1 servings of fruits and vegetables daily.He consumes 0 sweetened beverage(s) daily.He  exercises with enough effort to increase his heart rate 20 to 29 minutes per day.  He exercises with enough effort to increase his heart rate 3 or less days per week.   He is taking medications regularly.    Today's PHQ-9         PHQ-9 Total Score: 2    PHQ-9 Q9 Thoughts of better off dead/self-harm past 2 weeks :   Not at all    How difficult have these problems made it for you to do your work, take care of things at home, or get along with other people: Not difficult at all     BP Readings from Last 2 Encounters:   03/14/23 (!) 142/94   09/03/21 112/74     Hemoglobin A1C (%)   Date Value   03/14/2023 6.3 (H)   08/29/2021 >14.0 (H)     LDL Cholesterol Calculated (mg/dL)   Date Value   03/14/2023 120 (H)       HPI: 32 year old male with hx of DM2, opiate dependence with hx of IV drug use (last use date in 2013), and tobacco use with 17 year pack hx presents to clinic for diabetes follow-up. He also reports daily alcohol use. He was last seen in clinic in August of 2021 for new onset DM2. At that time he reported approx. 70lb weight loss upon presentation and a Hbg A1C level was drawn at over 14. He was started on insulin glargine and metformin initially for management and worked with a diabetes education for about 3-4 months following his initial diagnosis. He has not had a recheck of his diabetes since 8/29/21. Since that time, he reports numbness and tingling in his toes. He is monitoring his blood sugar in the AM and with meals getting readings around 100-200. He is taking 23 units of insulin glargine daily, but is no longer taking metformin. He denies any episodes with sxs of hypoglycemia. Hie meter today shows an average blood sugar reading of 143.    Review of Systems   CONSTITUTIONAL: NEGATIVE for fever, chills, change in weight  EYES: NEGATIVE for vision changes or irritation  RESP: NEGATIVE for significant cough or SOB  CV: NEGATIVE for chest pain, palpitations or peripheral edema  GI: NEGATIVE for nausea,  abdominal pain, heartburn, or change in bowel habits  : NEGATIVE for frequency, dysuria, or hematuria  MUSCULOSKELETAL: NEGATIVE for significant arthralgias or myalgia  NEURO: Positive for numbness and tingling in toes bilaterally  ENDOCRINE: NEGATIVE for temperature intolerance, skin/hair changes  PSYCHIATRIC: NEGATIVE for changes in mood or affect      Objective    BP (!) 142/94   Pulse 86   Temp 97.9  F (36.6  C) (Tympanic)   Resp 16   Wt 106.5 kg (234 lb 12.8 oz)   SpO2 95%   BMI 30.15 kg/m    Body mass index is 30.15 kg/m .  Physical Exam   GENERAL: healthy, alert and no distress  EYES: Eyes grossly normal to inspection, PERRL and conjunctivae and sclerae normal  NECK: no adenopathy, no asymmetry, masses, or scars and thyroid normal to palpation  RESP: lungs clear to auscultation - no rales, rhonchi or wheezes  CV: regular rate and rhythm, normal S1 S2, no S3 or S4, no murmur, click or rub, no peripheral edema and peripheral pulses strong (radial, pedal, tibial, dorsalis)  MS: no gross musculoskeletal defects noted, non-pitting edema noted in bilateral hands up to wrists,   SKIN: hemosiderin staining noted on lower extremities around ankle area, erythema and spider veins noted on hands bilaterally  NEURO: Normal strength and tone and mentation intact, diabetic foot exam positive for peripheral neuropathy in bottom of all toes and medial aspect of great toes bilaterally to monofilament testing  PSYCH: mentation appears normal, affect normal/bright  Diabetic foot exam: normal DP and PT pulses, normal monofilament exam, except for findings noted on diabetic foot graphical image.          , reduced sensation at reas 1, 2,3,10, trophic changes of reddish-purple skins discoloration and hemosiderin staining noted on bilateral ankles and dependent rubor present      No results found for this or any previous visit (from the past 24 hour(s)).

## 2023-03-16 ENCOUNTER — MYC MEDICAL ADVICE (OUTPATIENT)
Dept: FAMILY MEDICINE | Facility: CLINIC | Age: 33
End: 2023-03-16

## 2023-03-16 VITALS
SYSTOLIC BLOOD PRESSURE: 142 MMHG | RESPIRATION RATE: 16 BRPM | BODY MASS INDEX: 30.15 KG/M2 | OXYGEN SATURATION: 95 % | TEMPERATURE: 97.9 F | DIASTOLIC BLOOD PRESSURE: 94 MMHG | HEART RATE: 86 BPM | WEIGHT: 234.8 LBS

## 2023-06-11 ENCOUNTER — TRANSFERRED RECORDS (OUTPATIENT)
Dept: MULTI SPECIALTY CLINIC | Facility: CLINIC | Age: 33
End: 2023-06-11

## 2023-06-11 LAB — RETINOPATHY: NORMAL

## 2023-07-22 ENCOUNTER — HEALTH MAINTENANCE LETTER (OUTPATIENT)
Age: 33
End: 2023-07-22

## 2023-08-14 DIAGNOSIS — Z79.4 TYPE 2 DIABETES MELLITUS WITH DIABETIC NEUROPATHY, WITH LONG-TERM CURRENT USE OF INSULIN (H): ICD-10-CM

## 2023-08-14 DIAGNOSIS — I10 BENIGN ESSENTIAL HYPERTENSION: ICD-10-CM

## 2023-08-14 DIAGNOSIS — E11.40 TYPE 2 DIABETES MELLITUS WITH DIABETIC NEUROPATHY, WITH LONG-TERM CURRENT USE OF INSULIN (H): ICD-10-CM

## 2023-08-14 RX ORDER — LISINOPRIL 5 MG/1
5 TABLET ORAL DAILY
Qty: 90 TABLET | Refills: 1 | Status: SHIPPED | OUTPATIENT
Start: 2023-08-14 | End: 2023-10-10

## 2023-08-14 NOTE — TELEPHONE ENCOUNTER
Pending Prescriptions:                       Disp   Refills    lisinopril (ZESTRIL) 5 MG tablet          90 tab*0            Sig: Take 1 tablet (5 mg) by mouth daily

## 2023-08-14 NOTE — TELEPHONE ENCOUNTER
Routing refill request to provider for review/approval because:  Labs out of range:    BP Readings from Last 3 Encounters:   03/14/23 (!) 142/94   09/03/21 112/74   08/29/21 115/75     Creatinine   Date Value Ref Range Status   03/14/2023 0.57 (L) 0.67 - 1.17 mg/dL Final   10/20/2011 0.80 0.66 - 1.25 mg/dL Final     Date of last OV with Ymui Hooks NP: 3/14/23.    Julie Behrendt RN

## 2023-09-15 ENCOUNTER — E-VISIT (OUTPATIENT)
Dept: PEDIATRICS | Facility: CLINIC | Age: 33
End: 2023-09-15
Payer: COMMERCIAL

## 2023-09-15 DIAGNOSIS — J06.9 VIRAL URI: Primary | ICD-10-CM

## 2023-09-15 PROCEDURE — 99421 OL DIG E/M SVC 5-10 MIN: CPT | Performed by: PHYSICIAN ASSISTANT

## 2023-09-15 NOTE — PATIENT INSTRUCTIONS
Your symptoms suggest a viral infection. Antibiotics, which treat bacterial infections, will not help with a viral infection. Bacterial infections are usually associated with symptoms greater than 10-14 days, fever > 101 F, and/or significant worsening in symptoms after a temporary improvement. I suggest using over the counter treatments such as Flonase and Mucinex and being re-evaluated if not improving.

## 2023-09-18 ENCOUNTER — MYC MEDICAL ADVICE (OUTPATIENT)
Dept: FAMILY MEDICINE | Facility: CLINIC | Age: 33
End: 2023-09-18
Payer: COMMERCIAL

## 2023-09-18 DIAGNOSIS — E11.40 TYPE 2 DIABETES MELLITUS WITH DIABETIC NEUROPATHY, WITH LONG-TERM CURRENT USE OF INSULIN (H): ICD-10-CM

## 2023-09-18 DIAGNOSIS — Z79.4 TYPE 2 DIABETES MELLITUS WITH DIABETIC NEUROPATHY, WITH LONG-TERM CURRENT USE OF INSULIN (H): ICD-10-CM

## 2023-09-19 RX ORDER — INSULIN GLARGINE-YFGN 100 [IU]/ML
23 INJECTION, SOLUTION SUBCUTANEOUS AT BEDTIME
Qty: 9 ML | Refills: 0 | Status: SHIPPED | OUTPATIENT
Start: 2023-09-19 | End: 2023-10-20

## 2023-09-28 ENCOUNTER — MYC MEDICAL ADVICE (OUTPATIENT)
Dept: FAMILY MEDICINE | Facility: CLINIC | Age: 33
End: 2023-09-28
Payer: COMMERCIAL

## 2023-09-29 NOTE — TELEPHONE ENCOUNTER
It appears patient doesn't really have a PCP here, has seen Yumi Hooks only once several months ago.    MyChart response/advise sent per protocol and closing encounter.   Urine - Blood In-A-OH  [facility]      Jacki Bridges RN  [facility]

## 2023-10-09 ASSESSMENT — PATIENT HEALTH QUESTIONNAIRE - PHQ9
10. IF YOU CHECKED OFF ANY PROBLEMS, HOW DIFFICULT HAVE THESE PROBLEMS MADE IT FOR YOU TO DO YOUR WORK, TAKE CARE OF THINGS AT HOME, OR GET ALONG WITH OTHER PEOPLE: NOT DIFFICULT AT ALL
SUM OF ALL RESPONSES TO PHQ QUESTIONS 1-9: 3
SUM OF ALL RESPONSES TO PHQ QUESTIONS 1-9: 3

## 2023-10-10 ENCOUNTER — OFFICE VISIT (OUTPATIENT)
Dept: FAMILY MEDICINE | Facility: CLINIC | Age: 33
End: 2023-10-10
Payer: COMMERCIAL

## 2023-10-10 VITALS
SYSTOLIC BLOOD PRESSURE: 140 MMHG | RESPIRATION RATE: 16 BRPM | BODY MASS INDEX: 29.39 KG/M2 | HEIGHT: 74 IN | OXYGEN SATURATION: 96 % | HEART RATE: 89 BPM | TEMPERATURE: 97.4 F | DIASTOLIC BLOOD PRESSURE: 96 MMHG | WEIGHT: 229 LBS

## 2023-10-10 DIAGNOSIS — Z11.59 NEED FOR HEPATITIS C SCREENING TEST: ICD-10-CM

## 2023-10-10 DIAGNOSIS — K74.60 CIRRHOSIS OF LIVER WITHOUT ASCITES, UNSPECIFIED HEPATIC CIRRHOSIS TYPE (H): ICD-10-CM

## 2023-10-10 DIAGNOSIS — E78.5 HYPERLIPIDEMIA LDL GOAL <70: ICD-10-CM

## 2023-10-10 DIAGNOSIS — Z79.4 TYPE 2 DIABETES MELLITUS WITH DIABETIC NEUROPATHY, WITH LONG-TERM CURRENT USE OF INSULIN (H): Primary | ICD-10-CM

## 2023-10-10 DIAGNOSIS — I10 BENIGN ESSENTIAL HYPERTENSION: ICD-10-CM

## 2023-10-10 DIAGNOSIS — F10.10 ETOH ABUSE: ICD-10-CM

## 2023-10-10 DIAGNOSIS — Z11.4 SCREENING FOR HIV (HUMAN IMMUNODEFICIENCY VIRUS): ICD-10-CM

## 2023-10-10 DIAGNOSIS — E11.40 TYPE 2 DIABETES MELLITUS WITH DIABETIC NEUROPATHY, WITH LONG-TERM CURRENT USE OF INSULIN (H): Primary | ICD-10-CM

## 2023-10-10 DIAGNOSIS — Z00.00 ROUTINE HEALTH MAINTENANCE: ICD-10-CM

## 2023-10-10 LAB
ALBUMIN SERPL BCG-MCNC: 5.1 G/DL (ref 3.5–5.2)
ALP SERPL-CCNC: 92 U/L (ref 40–129)
ALT SERPL W P-5'-P-CCNC: 109 U/L (ref 0–70)
AST SERPL W P-5'-P-CCNC: 134 U/L (ref 0–45)
BILIRUB DIRECT SERPL-MCNC: 0.28 MG/DL (ref 0–0.3)
BILIRUB SERPL-MCNC: 1.2 MG/DL
HAV IGG SER QL IA: NONREACTIVE
HBA1C MFR BLD: 6.5 % (ref 0–5.6)
HBV CORE AB SERPL QL IA: NONREACTIVE
HBV SURFACE AB SERPL IA-ACNC: 286.59 M[IU]/ML
HBV SURFACE AB SERPL IA-ACNC: REACTIVE M[IU]/ML
HBV SURFACE AG SERPL QL IA: NONREACTIVE
HCV AB SERPL QL IA: NONREACTIVE
HCV AB SERPL QL IA: NONREACTIVE
HIV 1+2 AB+HIV1 P24 AG SERPL QL IA: NONREACTIVE
PROT SERPL-MCNC: 8.3 G/DL (ref 6.4–8.3)

## 2023-10-10 PROCEDURE — 87389 HIV-1 AG W/HIV-1&-2 AB AG IA: CPT | Performed by: NURSE PRACTITIONER

## 2023-10-10 PROCEDURE — 86704 HEP B CORE ANTIBODY TOTAL: CPT | Performed by: NURSE PRACTITIONER

## 2023-10-10 PROCEDURE — 80076 HEPATIC FUNCTION PANEL: CPT | Performed by: NURSE PRACTITIONER

## 2023-10-10 PROCEDURE — 87340 HEPATITIS B SURFACE AG IA: CPT | Performed by: NURSE PRACTITIONER

## 2023-10-10 PROCEDURE — 86706 HEP B SURFACE ANTIBODY: CPT | Performed by: NURSE PRACTITIONER

## 2023-10-10 PROCEDURE — 83036 HEMOGLOBIN GLYCOSYLATED A1C: CPT | Performed by: NURSE PRACTITIONER

## 2023-10-10 PROCEDURE — 36415 COLL VENOUS BLD VENIPUNCTURE: CPT | Performed by: NURSE PRACTITIONER

## 2023-10-10 PROCEDURE — 99214 OFFICE O/P EST MOD 30 MIN: CPT | Performed by: NURSE PRACTITIONER

## 2023-10-10 PROCEDURE — 86803 HEPATITIS C AB TEST: CPT | Mod: 59 | Performed by: NURSE PRACTITIONER

## 2023-10-10 PROCEDURE — 86708 HEPATITIS A ANTIBODY: CPT | Performed by: NURSE PRACTITIONER

## 2023-10-10 PROCEDURE — 86803 HEPATITIS C AB TEST: CPT | Performed by: NURSE PRACTITIONER

## 2023-10-10 RX ORDER — LISINOPRIL 10 MG/1
10 TABLET ORAL DAILY
Qty: 30 TABLET | Refills: 1 | Status: SHIPPED | OUTPATIENT
Start: 2023-10-10 | End: 2023-12-19

## 2023-10-10 RX ORDER — GABAPENTIN 300 MG/1
CAPSULE ORAL
Qty: 101 CAPSULE | Refills: 0 | Status: SHIPPED | OUTPATIENT
Start: 2023-10-10 | End: 2024-01-11

## 2023-10-10 RX ORDER — LISINOPRIL 5 MG/1
5 TABLET ORAL DAILY
Qty: 90 TABLET | Refills: 1 | Status: SHIPPED | OUTPATIENT
Start: 2023-10-10 | End: 2023-10-10

## 2023-10-10 RX ORDER — ATORVASTATIN CALCIUM 10 MG/1
10 TABLET, FILM COATED ORAL DAILY
Qty: 90 TABLET | Refills: 3 | Status: SHIPPED | OUTPATIENT
Start: 2023-10-10

## 2023-10-10 ASSESSMENT — PAIN SCALES - GENERAL: PAINLEVEL: NO PAIN (0)

## 2023-10-10 NOTE — PATIENT INSTRUCTIONS
Make nursing and lab appointment in 1 month for recheck BP and labs.  I will adjust medication if needed.  Labs today, you will be notified of results.  Make appointment for hepatology due to recent CT scan.  Take gabapentin taper to help with withdrawal. Make appointment with addiction medicine for alcohol abuse assessment and assistance.  Medication refilled.  Starting statin for cholesterol lowering due to diabetes.  Schedule US of liver at [phone]  Get copy of your eye exam turned into our  for this to be at compliance.  Follow-up in 6 months for diabetic recheck.

## 2023-10-10 NOTE — PROGRESS NOTES
Assessment & Plan     Type 2 diabetes mellitus with diabetic neuropathy, with long-term current use of insulin (H)  A1C ordered, urine random albumin ordered, and lipid ordered today for evaluation.  Patient continues on his long acting insulin at night with occasional blood sugars in 200's and no low's under 70.  I will notify him of results.  I did start him on Lipitor 10 mg due to elevated LDL since he is at higher risk for cardiac and stroke complications.  Patient declines vaccinations today.  Plan to follow-up in 6 months if A1C is normal for recheck on diabetes.    Hyperlipidemia LDL goal <70  See note above.  - atorvastatin (LIPITOR) 10 MG tablet; Take 1 tablet (10 mg) by mouth daily    Benign essential hypertension  Increased Lisinopril to 10 mg daily due to elevated BP.  Plan to recheck BP and get lab in 1 month to check electrolytes and kidney function.  - Basic metabolic panel  (Ca, Cl, CO2, Creat, Gluc, K, Na, BUN); Future  - lisinopril (ZESTRIL) 10 MG tablet; Take 1 tablet (10 mg) by mouth daily    Cirrhosis of liver without ascites, unspecified hepatic cirrhosis type (H)  Workup started on cirrhosis of the liver.  Hep B, Hep A, Hep C and liver function labs ordered for evaluation.  I also ordered ultrasound of the liver and referral to hepatology.  - Hepatitis B core antibody; Future  - Hepatitis B surface antigen; Future  - Hepatitis B Surface Antibody; Future  - Hepatitis Antibody A IgG; Future  - Hepatitis C antibody; Future  - US Abdomen Limited; Future  - Hepatic panel (Albumin, ALT, AST, Bili, Alk Phos, TP); Future  - Adult GI  Referral - Consult Only; Future    ETOH abuse  Patient is working on decreasing alcohol intake since it has been daily.  I have started him on gabapentin to work through his withdrawal symptoms and referred to addiction medicine to help with alcohol cessation.  - Adult Mental Health  Referral; Future  - gabapentin (NEURONTIN) 300 MG capsule; Take 1  capsule (300 mg) by mouth every 6 hours for 1 day, THEN 1 capsule (300 mg) every 8 hours for 1 day, THEN 1 capsule (300 mg) every 12 hours for 1 day, THEN 1 capsule (300 mg) daily as needed for other (break through withdrawl symptoms).    Routine health maintenance  - REVIEW OF HEALTH MAINTENANCE PROTOCOL ORDERS    Screening for HIV (human immunodeficiency virus)  - HIV Antigen Antibody Combo; Future    Need for hepatitis C screening test  - Hepatitis C Screen Reflex to HCV RNA Quant and Genotype; Future    See Patient Instructions    Yumi Hooks NP  [facility] NABOR Hagen is a 33 year old, presenting for the following health issues:  Hypertension and Diabetes  PT HAS BEEN WITH OUT MEDICATIONS FOR BLOOD PRESSURE FOR A COUPLE OF MONTHS NOW       10/10/2023     9:25 AM   Additional Questions   Roomed by rmb   Accompanied by self         10/10/2023     9:25 AM   Patient Reported Additional Medications   Patient reports taking the following new medications none       History of Present Illness       Diabetes:   He presents for follow up of diabetes.  He is checking home blood glucose one time daily.   He checks blood glucose before meals.  Blood glucose is sometimes over 200 and never under 70. He is aware of hypoglycemia symptoms including shakiness.    He has no concerns regarding his diabetes at this time.  He is having numbness in feet.  The patient has had a diabetic eye exam in the last 12 months. Eye exam performed on june 11. Location of last eye exam [facility] in [place].        Hypertension: He presents for follow up of hypertension.  He does not check blood pressure  regularly outside of the clinic. Outside blood pressures have been over 140/90. He does not follow a low salt diet.     He eats 2-3 servings of fruits and vegetables daily.He consumes 0 sweetened beverage(s) daily.He exercises with enough effort to increase his heart rate 20 to 29 minutes per day.   "He exercises with enough effort to increase his heart rate 6 days per week.   He is taking medications regularly.     Patient is a daily alcohol user.  Recently he was having blood in the urine and underwent CT which showed signs of cirrhosis of the liver with no acities.  He is trying to work on decreasing his drinking but asking for help since he is concerned about withdrawal.  He also is not sure if he should be seeing a specialist or having any other testing done at this time.    Patient is doing well with his diabetes, still has neuropathy with no changes.  BP has been elevated and he is concerned about this.  He admits he has been out of his medications for a couple months.        Review of Systems   CONSTITUTIONAL: NEGATIVE for fever, chills, change in weight  RESP: NEGATIVE for significant cough or SOB  CV: NEGATIVE for chest pain, palpitations or peripheral edema  PSYCHIATRIC: NEGATIVE for changes in mood or affect  ROS otherwise negative      Objective    BP (!) 140/96   Pulse 89   Temp 97.4  F (36.3  C) (Tympanic)   Resp 16   Ht 1.885 m (6' 2.21\")   Wt 103.9 kg (229 lb)   SpO2 96%   BMI 29.23 kg/m    Body mass index is 29.23 kg/m .  Physical Exam   GENERAL: healthy, alert and no distress  RESP: lungs clear to auscultation - no rales, rhonchi or wheezes  CV: regular rate and rhythm, normal S1 S2, no S3 or S4, no murmur, click or rub, no peripheral edema and peripheral pulses strong  PSYCH: mentation appears normal, affect normal/bright               "

## 2023-10-10 NOTE — LETTER
My Depression Action Plan  Name: Hieu Carson   Date of Birth 1990  Date: 10/10/2023    My doctor: No Ref-Primary, Physician   My clinic: [facility]  [address] [address]  [phone]            GREEN    ZONE   Good Control    What it looks like:   Things are going generally well. You have normal ups and downs. You may even feel depressed from time to time, but bad moods usually last less than a day.   What you need to do:  Continue to care for yourself (see self care plan)  Check your depression survival kit and update it as needed  Follow your physician s recommendations including any medication.  Do not stop taking medication unless you consult with your physician first.             YELLOW         ZONE Getting Worse    What it looks like:   Depression is starting to interfere with your life.   It may be hard to get out of bed; you may be starting to isolate yourself from others.  Symptoms of depression are starting to last most all day and this has happened for several days.   You may have suicidal thoughts but they are not constant.   What you need to do:     Call your care team. Your response to treatment will improve if you keep your care team informed of your progress. Yellow periods are signs an adjustment may need to be made.     Continue your self-care.  Just get dressed and ready for the day.  Don't give yourself time to talk yourself out of it.    Talk to someone in your support network.    Open up your Depression Self-Care Plan/Wellness Kit.             RED    ZONE Medical Alert - Get Help    What it looks like:   Depression is seriously interfering with your life.   You may experience these or other symptoms: You can t get out of bed most days, can t work or engage in other necessary activities, you have trouble taking care of basic hygiene, or basic responsibilities, thoughts of suicide or death that will not go away, self-injurious  behavior.     What you need to do:  Call your care team and request a same-day appointment. If they are not available (weekends or after hours) call your local crisis line, emergency room or 911.          Depression Self-Care Plan / Wellness Kit    Many people find that medication and therapy are helpful treatments for managing depression. In addition, making small changes to your everyday life can help to boost your mood and improve your wellbeing. Below are some tips for you to consider. Be sure to talk with your medical provider and/or behavioral health consultant if your symptoms are worsening or not improving.     Sleep   Sleep hygiene  means all of the habits that support good, restful sleep. It includes maintaining a consistent bedtime and wake time, using your bedroom only for sleeping or sex, and keeping the bedroom dark and free of distractions like a computer, smartphone, or television.     Develop a Healthy Routine  Maintain good hygiene. Get out of bed in the morning, make your bed, brush your teeth, take a shower, and get dressed. Don t spend too much time viewing media that makes you feel stressed. Find time to relax each day.    Exercise  Get some form of exercise every day. This will help reduce pain and release endorphins, the  feel good  chemicals in your brain. It can be as simple as just going for a walk or doing some gardening, anything that will get you moving.      Diet  Strive to eat healthy foods, including fruits and vegetables. Drink plenty of water. Avoid excessive sugar, caffeine, alcohol, and other mood-altering substances.     Stay Connected with Others  Stay in touch with friends and family members.    Manage Your Mood  Try deep breathing, massage therapy, biofeedback, or meditation. Take part in fun activities when you can. Try to find something to smile about each day.     Psychotherapy  Be open to working with a therapist if your provider recommends it.     Medication  Be sure to  take your medication as prescribed. Most anti-depressants need to be taken every day. It usually takes several weeks for medications to work. Not all medicines work for all people. It is important to follow-up with your provider to make sure you have a treatment plan that is working for you. Do not stop your medication abruptly without first discussing it with your provider.    Crisis Resources   These hotlines are for both adults and children. They and are open 24 hours a day, 7 days a week unless noted otherwise.    National Suicide Prevention Lifeline   988 or 2-896-305-RDCT (9365)    Crisis Text Line    www.crisistextline.org  Text HOME to 081171 from anywhere in the [place], anytime, about any type of crisis. A live, trained crisis counselor will receive the text and respond quickly.    [facility] Lifeline for LGBTQ Youth  A national crisis intervention and suicide lifeline for LGBTQ youth under 25. Provides a safe place to talk without judgement. Call 1-208.215.5833; text START to 485213 or visit www.thetrevorproject.org to talk to a trained counselor.    For Critical access hospital crisis numbers, visit the [facility] website at:  https://[place].gov/dhs/people-we-serve/adults/health-care/mental-health/resources/crisis-contacts.jsp

## 2023-10-20 ENCOUNTER — MYC REFILL (OUTPATIENT)
Dept: FAMILY MEDICINE | Facility: CLINIC | Age: 33
End: 2023-10-20
Payer: COMMERCIAL

## 2023-10-20 DIAGNOSIS — E11.40 TYPE 2 DIABETES MELLITUS WITH DIABETIC NEUROPATHY, WITH LONG-TERM CURRENT USE OF INSULIN (H): ICD-10-CM

## 2023-10-20 DIAGNOSIS — Z79.4 TYPE 2 DIABETES MELLITUS WITH DIABETIC NEUROPATHY, WITH LONG-TERM CURRENT USE OF INSULIN (H): ICD-10-CM

## 2023-10-20 RX ORDER — INSULIN GLARGINE-YFGN 100 [IU]/ML
23 INJECTION, SOLUTION SUBCUTANEOUS AT BEDTIME
Qty: 27 ML | Refills: 1 | Status: SHIPPED | OUTPATIENT
Start: 2023-10-20 | End: 2024-02-22

## 2023-11-24 NOTE — CONFIDENTIAL NOTE
DIAGNOSIS:     Cirrhosis of liver without ascites, unspecified hepatic cirrhosis type (H)   Fatty liver   Hepatomegaly      Appt Date:  12.12.2023    NOTES STATUS DETAILS   OFFICE NOTE from referring provider Internal 10.10.2023 Yumi Hooks NP    OFFICE NOTES from other specialists     DISCHARGE SUMMARY from hospital     MEDICATION LIST Internal    LIVER BIOSPY (IF APPLICABLE)      PATHOLOGY REPORTS      IMAGING     ENDOSCOPY (IF AVAILABLE)     COLONOSCOPY (IF AVAILABLE)     ULTRASOUND LIVER     CT OF ABDOMEN     MRI OF LIVER     FIBROSCAN, US ELASTOGRAPHY, FIBROSIS SCAN, MR ELASTOGRAPHY     LABS     HEPATIC PANEL (LIVER PANEL) Internal 10.10.2023    BASIC METABOLIC PANEL Internal 08.29.2021   COMPLETE METABOLIC PANEL Internal 02.14.2020   COMPLETE BLOOD COUNT (CBC) Internal 02.14.2020   INTERNATIONAL NORMALIZED RATIO (INR)     HEPATITIS C ANTIBODY Internal 10.10.2023   HEPATITIS C VIRAL LOAD/PCR     HEPATITIS C GENOTYPE     HEPATITIS B SURFACE ANTIGEN Internal 10.10.2023   HEPATITIS B SURFACE ANTIBODY Internal 10.10.2023   HEPATITIS B DNA QUANT LEVEL     HEPATITIS B CORE ANTIBODY Internal 10.10.2023

## 2023-12-04 DIAGNOSIS — K76.0 FATTY LIVER: Primary | ICD-10-CM

## 2023-12-04 DIAGNOSIS — R16.0 ENLARGED LIVER: ICD-10-CM

## 2023-12-07 ENCOUNTER — PRE VISIT (OUTPATIENT)
Dept: GASTROENTEROLOGY | Facility: CLINIC | Age: 33
End: 2023-12-07
Payer: COMMERCIAL

## 2023-12-07 NOTE — TELEPHONE ENCOUNTER
Was the patient contacted by phone and reminded of the upcoming visit? message left    Was the patient instructed to bring a current list of all medications to the appointment or instructed to bring in all medication bottles? Yes     Is it anticipated the patient will need additional appointments? Yes, labs    Were the additional appointments scheduled? Currently scheduled at [facility] on Monday, message left requesting he gets drawn this week as if he gets drawn day before results will not be available.     Were the needed lab orders placed? Yes    Kimber Schmidt CMA  12/7/2023 1:31 PM

## 2023-12-11 ENCOUNTER — LAB (OUTPATIENT)
Dept: LAB | Facility: CLINIC | Age: 33
End: 2023-12-11
Payer: COMMERCIAL

## 2023-12-11 DIAGNOSIS — K74.60 CIRRHOSIS OF LIVER WITHOUT ASCITES, UNSPECIFIED HEPATIC CIRRHOSIS TYPE (H): ICD-10-CM

## 2023-12-11 DIAGNOSIS — R16.0 ENLARGED LIVER: ICD-10-CM

## 2023-12-11 DIAGNOSIS — K76.0 FATTY LIVER: ICD-10-CM

## 2023-12-11 LAB
ALBUMIN SERPL BCG-MCNC: 5.3 G/DL (ref 3.5–5.2)
ALP SERPL-CCNC: 79 U/L (ref 40–150)
ALT SERPL W P-5'-P-CCNC: 94 U/L (ref 0–70)
ANION GAP SERPL CALCULATED.3IONS-SCNC: 14 MMOL/L (ref 7–15)
AST SERPL W P-5'-P-CCNC: 112 U/L (ref 0–45)
BILIRUB DIRECT SERPL-MCNC: 0.39 MG/DL (ref 0–0.3)
BILIRUB SERPL-MCNC: 1.4 MG/DL
BUN SERPL-MCNC: 6.6 MG/DL (ref 6–20)
CALCIUM SERPL-MCNC: 9.3 MG/DL (ref 8.6–10)
CHLORIDE SERPL-SCNC: 98 MMOL/L (ref 98–107)
CREAT SERPL-MCNC: 0.5 MG/DL (ref 0.67–1.17)
DEPRECATED HCO3 PLAS-SCNC: 26 MMOL/L (ref 22–29)
EGFRCR SERPLBLD CKD-EPI 2021: >90 ML/MIN/1.73M2
ERYTHROCYTE [DISTWIDTH] IN BLOOD BY AUTOMATED COUNT: 11.7 % (ref 10–15)
GLUCOSE SERPL-MCNC: 166 MG/DL (ref 70–99)
HCT VFR BLD AUTO: 42.4 % (ref 40–53)
HGB BLD-MCNC: 14.8 G/DL (ref 13.3–17.7)
INR PPP: 1.27 (ref 0.85–1.15)
MCH RBC QN AUTO: 31.9 PG (ref 26.5–33)
MCHC RBC AUTO-ENTMCNC: 34.9 G/DL (ref 31.5–36.5)
MCV RBC AUTO: 91 FL (ref 78–100)
PLATELET # BLD AUTO: 70 10E3/UL (ref 150–450)
POTASSIUM SERPL-SCNC: 4 MMOL/L (ref 3.4–5.3)
PROT SERPL-MCNC: 8.1 G/DL (ref 6.4–8.3)
RBC # BLD AUTO: 4.64 10E6/UL (ref 4.4–5.9)
SODIUM SERPL-SCNC: 138 MMOL/L (ref 135–145)
WBC # BLD AUTO: 3.9 10E3/UL (ref 4–11)

## 2023-12-11 PROCEDURE — 82105 ALPHA-FETOPROTEIN SERUM: CPT

## 2023-12-11 PROCEDURE — 85027 COMPLETE CBC AUTOMATED: CPT

## 2023-12-11 PROCEDURE — 85610 PROTHROMBIN TIME: CPT

## 2023-12-11 PROCEDURE — 82248 BILIRUBIN DIRECT: CPT

## 2023-12-11 PROCEDURE — 80053 COMPREHEN METABOLIC PANEL: CPT

## 2023-12-11 PROCEDURE — 36415 COLL VENOUS BLD VENIPUNCTURE: CPT

## 2023-12-12 ENCOUNTER — VIRTUAL VISIT (OUTPATIENT)
Dept: GASTROENTEROLOGY | Facility: CLINIC | Age: 33
End: 2023-12-12
Attending: NURSE PRACTITIONER
Payer: COMMERCIAL

## 2023-12-12 ENCOUNTER — PRE VISIT (OUTPATIENT)
Dept: GASTROENTEROLOGY | Facility: CLINIC | Age: 33
End: 2023-12-12
Payer: COMMERCIAL

## 2023-12-12 DIAGNOSIS — D69.6 THROMBOCYTOPENIA (H): ICD-10-CM

## 2023-12-12 DIAGNOSIS — R79.89 LFT ELEVATION: ICD-10-CM

## 2023-12-12 DIAGNOSIS — K74.60 CIRRHOSIS OF LIVER WITHOUT ASCITES, UNSPECIFIED HEPATIC CIRRHOSIS TYPE (H): Primary | ICD-10-CM

## 2023-12-12 DIAGNOSIS — R16.1 SPLENOMEGALY: ICD-10-CM

## 2023-12-12 DIAGNOSIS — D68.9 COAGULOPATHY (H): ICD-10-CM

## 2023-12-12 PROCEDURE — 99204 OFFICE O/P NEW MOD 45 MIN: CPT | Mod: VID | Performed by: PHYSICIAN ASSISTANT

## 2023-12-12 RX ORDER — LANCETS
EACH MISCELLANEOUS
COMMUNITY
Start: 2023-06-16

## 2023-12-12 NOTE — PROGRESS NOTES
Virtual Visit Details    Type of service:  Video Visit     Originating Location (pt. Location): Home    Distant Location (provider location):  On-site  Platform used for Video Visit: Carol

## 2023-12-12 NOTE — PROGRESS NOTES
Hepatology Clinic note  Hieu Carson   Date of Birth 1990  Date of Service 12/12/2023    REASON FOR CONSULTATION: concerns of cirrhosis; alcohol abuse   REFERRING PROVIDER: Yumi Hooks NP         Assessment/plan:     Hieu Carson is a 33 year old male with hx of alcohol use disorder presenting virtually for concerns of cirrhosis. No known hx of liver disease other than mentions of fatty liver in the past.  Family hx positive for heavy alcohol use.    Lab work showing evidence of: Thrombocytopenia (plt 70), coagulopathy (INR 1.27), hyperbilirubinemia (T. bili 1.4), transaminitis (, ALT 94). All supporting cirrhosis.     CT abd/pelvis 9/30/23: Splenomegaly. Enlarged liver with nodularity.  Diffusely steatotic.  No definitive focal liver lesion.  No ascites.    Suspect patient has ongoing compensated cirrhosis secondary to combination of chronic, heavy alcohol consumption as well as possibly partly due to fatty liver.    Ascites: no hx of   HCC screening: ordered AFP; CT abd/pelvis 9/30/23  EGD: no hx     MELD 3.0: 10 at 12/11/2023 12:00 PM  MELD-Na: 11 at 12/11/2023 12:00 PM  Calculated from:  Serum Creatinine: 0.50 mg/dL (Using min of 1 mg/dL) at 12/11/2023 12:00 PM  Serum Sodium: 138 mmol/L (Using max of 137 mmol/L) at 12/11/2023 12:00 PM  Total Bilirubin: 1.4 mg/dL at 12/11/2023 12:00 PM  Serum Albumin: 5.3 g/dL (Using max of 3.5 g/dL) at 12/11/2023 12:00 PM  INR(ratio): 1.27 at 12/11/2023 12:00 PM  Age at listing (hypothetical): 33 years  Sex: Male at 12/11/2023 12:00 PM    - Schedule EGD  - Next US abd March 2024  - HCC screen every 6 mo  - Remain abstinent from all alcohol use  - Complete some sort of chemical dependency  treatment   - Follow-up virtually in 3 months     Gisselle Back PA-C  [facility]   -----------------------------------------------------       HPI:     Hieu Carson is a 33 year old male with a PMHX significant for hx of opiate  dependence on Suboxone, depression, tobacco use disorder who was referred by PCP for concerns regarding cirrhosis and alcohol abuse history.    Patient believes he has been told that he had signs of fatty liver disease years ago.  Other than that he never been diagnosed with liver disease.  Denies known family history of liver disease.  Does admit to family history of heavy alcohol use.    States he recently went into urgent care after having some lower back pain and blood in his urine.  Patient states labs were done as well as a CT scan.  CT scan is what showed concerns regarding his liver.  PCP then referred him here to hepatology.    Last drink of alcohol was approximately 11/10/2023.  Was told in the past that his drinking was too heavy.  Has been drinking vodka daily.  At his heaviest use, was drinking up to 0.75 L of vodka daily.  No history of chemical dependency treatment for alcohol.    Admits to history of abusing opioids and then eventually IV heroin.  No IV drug use for over 10 years.  Has been clean from illicit drug use for over 10 years.  Did complete inpatient treatment x 2 for this.    Uses marijuana every 4 to 5 months.  Smokes approximately half pack cigarettes daily and has been smoking for the past 13 years.    Sees psych provider every 3 months typically.  This is the provider that prescribes Suboxone and sertraline.    Typically checks his blood sugars 2 times daily.  Was diagnosed with type 2 diabetes over 1 year ago.  He is currently using long-acting insulin.    Health Maintenance:  Dexa scan: none  Hep A/B vaccines: has immunity to Hep B; recommended Hep A vaccine  Colonoscopy: none     Medical hx Surgical hx   Past Medical History:   Diagnosis Date    Depressive disorder     Opioid abuse (H)     Tobacco abuse     Urethral stricture     Past Surgical History:   Procedure Laterality Date    GENITOURINARY SURGERY      URETHRAL DILATION      x3    WISDOM TOOTH EXTRACTION                  Medications:     Current Outpatient Medications   Medication    blood glucose monitoring (SOFTCLIX) lancets    atorvastatin (LIPITOR) 10 MG tablet    blood glucose (NO BRAND SPECIFIED) lancets standard    blood glucose (NO BRAND SPECIFIED) test strip    blood glucose monitoring (ACCU-CHEK FE PLUS) meter device kit    buprenorphine HCl-naloxone HCl (SUBOXONE) 8-2 MG per film    gabapentin (NEURONTIN) 300 MG capsule    Insulin Glargine-yfgn 100 UNIT/ML SOPN    insulin pen needle (32G X 4 MM) 32G X 4 MM miscellaneous    lisinopril (ZESTRIL) 10 MG tablet    sertraline (ZOLOFT) 100 MG tablet     No current facility-administered medications for this visit.          Allergies:   No Known Allergies         Social History:     Social History     Socioeconomic History    Marital status: Single     Spouse name: Not on file    Number of children: Not on file    Years of education: Not on file    Highest education level: Not on file   Occupational History    Not on file   Tobacco Use    Smoking status: Every Day     Packs/day: 1.00     Years: 17.00     Additional pack years: 0.00     Total pack years: 17.00     Types: Cigarettes    Smokeless tobacco: Never   Vaping Use    Vaping Use: Never used   Substance and Sexual Activity    Alcohol use: Yes     Comment: couple beers or mixed drink daily    Drug use: Yes     Types: Marijuana     Comment: heroin (last date of use 10 years ago), uses marijauna once every few months    Sexual activity: Not on file   Other Topics Concern    Not on file   Social History Narrative    Not on file     Social Determinants of Health     Financial Resource Strain: Low Risk  (10/10/2023)    Financial Resource Strain     Within the past 12 months, have you or your family members you live with been unable to get utilities (heat, electricity) when it was really needed?: No   Food Insecurity: Low Risk  (10/10/2023)    Food Insecurity     Within the past 12 months, did you worry that your food would run  out before you got money to buy more?: No     Within the past 12 months, did the food you bought just not last and you didn t have money to get more?: No   Transportation Needs: Low Risk  (10/10/2023)    Transportation Needs     Within the past 12 months, has lack of transportation kept you from medical appointments, getting your medicines, non-medical meetings or appointments, work, or from getting things that you need?: No   Physical Activity: Not on file   Stress: Not on file   Social Connections: Not on file   Interpersonal Safety: Low Risk  (10/10/2023)    Interpersonal Safety     Do you feel physically and emotionally safe where you currently live?: Yes     Within the past 12 months, have you been hit, slapped, kicked or otherwise physically hurt by someone?: No     Within the past 12 months, have you been humiliated or emotionally abused in other ways by your partner or ex-partner?: No   Housing Stability: Low Risk  (10/10/2023)    Housing Stability     Do you have housing? : Yes     Are you worried about losing your housing?: No          Family History:     Family History   Problem Relation Age of Onset    Breast Cancer Mother     Alcoholism Mother     Gout Father     Skin Cancer Father     Diverticulitis Father     Diabetes Maternal Grandmother     Diabetes Paternal Grandmother           Review of Systems:   GEN: See HPI          Physical Exam:     VS: no vitals taken for this visit  Gen: A&Ox3, NAD, well developed  HEENT: sclera non-icteric    Skin: No jaundice  Neuro: grossly intact  Psych: appropriate mood and affects         Data:   Reviewed in person and significant for:    Lab Results   Component Value Date     12/11/2023     10/20/2011      Lab Results   Component Value Date    POTASSIUM 4.0 12/11/2023    POTASSIUM 4.0 08/29/2021    POTASSIUM 4.1 10/20/2011     Lab Results   Component Value Date    CHLORIDE 98 12/11/2023    CHLORIDE 94 08/29/2021    CHLORIDE 104 10/20/2011     Lab Results  "  Component Value Date    CO2 26 12/11/2023    CO2 27 08/29/2021    CO2 29 10/20/2011     Lab Results   Component Value Date    BUN 6.6 12/11/2023    BUN 8 08/29/2021    BUN 10 10/20/2011     Lab Results   Component Value Date    CR 0.50 12/11/2023    CR 0.80 10/20/2011       Lab Results   Component Value Date    WBC 3.9 12/11/2023    WBC 7.8 10/20/2011     Lab Results   Component Value Date    HGB 14.8 12/11/2023    HGB 14.5 10/20/2011     Lab Results   Component Value Date    HCT 42.4 12/11/2023    HCT 42.6 10/20/2011     Lab Results   Component Value Date    MCV 91 12/11/2023    MCV 88 10/20/2011     Lab Results   Component Value Date    PLT 70 12/11/2023     10/20/2011     Lab Results   Component Value Date     12/11/2023    AST 20 10/20/2011     Lab Results   Component Value Date    ALT 94 12/11/2023    ALT 22 10/20/2011     No results found for: \"BILICONJ\"   Lab Results   Component Value Date    BILITOTAL 1.4 12/11/2023    BILITOTAL 0.3 10/20/2011     Lab Results   Component Value Date    ALBUMIN 5.3 12/11/2023    ALBUMIN 4.6 08/29/2021    ALBUMIN 4.4 10/20/2011     Lab Results   Component Value Date    PROTTOTAL 8.1 12/11/2023    PROTTOTAL 6.6 10/20/2011      Lab Results   Component Value Date    ALKPHOS 79 12/11/2023    ALKPHOS 63 10/20/2011     Lab Results   Component Value Date    INR 1.27 12/11/2023       "

## 2023-12-12 NOTE — NURSING NOTE
Is the patient currently in the state of [place]? YES    Visit mode:VIDEO    If the visit is dropped, the patient can be reconnected by: VIDEO VISIT: Text to cell phone:   Telephone Information:   Mobile [phone]       Will anyone else be joining the visit? NO  (If patient encounters technical issues they should call [phone]    How would you like to obtain your AVS? MyChart    Are changes needed to the allergy or medication list? No    Reason for visit: Consult    Juan PLATT

## 2023-12-12 NOTE — LETTER
12/12/2023         RE: Hieu Carson  [address] [address]        Dear Colleague,    Thank you for referring your patient, Hieu Carson, to the [facility] [place]. Please see a copy of my visit note below.    Virtual Visit Details    Type of service:  Video Visit     Originating Location (pt. Location): Home    Distant Location (provider location):  On-site  Platform used for Video Visit: [facility]    Hepatology Clinic note  Hieu Carson   Date of Birth 1990  Date of Service 12/12/2023    REASON FOR CONSULTATION: concerns of cirrhosis; alcohol abuse   REFERRING PROVIDER: Ymui Hooks NP         Assessment/plan:     Hieu Carson is a 33 year old male with hx of alcohol use disorder presenting virtually for concerns of cirrhosis. No known hx of liver disease other than mentions of fatty liver in the past.  Family hx positive for heavy alcohol use.    Lab work showing evidence of: Thrombocytopenia (plt 70), coagulopathy (INR 1.27), hyperbilirubinemia (T. bili 1.4), transaminitis (, ALT 94). All supporting cirrhosis.     CT abd/pelvis 9/30/23: Splenomegaly. Enlarged liver with nodularity.  Diffusely steatotic.  No definitive focal liver lesion.  No ascites.    Suspect patient has ongoing compensated cirrhosis secondary to combination of chronic, heavy alcohol consumption as well as possibly partly due to fatty liver.    Ascites: no hx of   HCC screening: ordered AFP; CT abd/pelvis 9/30/23  EGD: no hx     MELD 3.0: 10 at 12/11/2023 12:00 PM  MELD-Na: 11 at 12/11/2023 12:00 PM  Calculated from:  Serum Creatinine: 0.50 mg/dL (Using min of 1 mg/dL) at 12/11/2023 12:00 PM  Serum Sodium: 138 mmol/L (Using max of 137 mmol/L) at 12/11/2023 12:00 PM  Total Bilirubin: 1.4 mg/dL at 12/11/2023 12:00 PM  Serum Albumin: 5.3 g/dL (Using max of 3.5 g/dL) at 12/11/2023 12:00 PM  INR(ratio): 1.27 at 12/11/2023 12:00 PM  Age at listing (hypothetical): 33 years  Sex:  Male at 12/11/2023 12:00 PM    - Schedule EGD  - Next US abd March 2024  - HCC screen every 6 mo  - Remain abstinent from all alcohol use  - Complete some sort of chemical dependency  treatment   - Follow-up virtually in 3 months     Gisselle Back PA-C  [facility]   -----------------------------------------------------       HPI:     Hieu Carson is a 33 year old male with a PMHX significant for hx of opiate dependence on Suboxone, depression, tobacco use disorder who was referred by PCP for concerns regarding cirrhosis and alcohol abuse history.    Patient believes he has been told that he had signs of fatty liver disease years ago.  Other than that he never been diagnosed with liver disease.  Denies known family history of liver disease.  Does admit to family history of heavy alcohol use.    States he recently went into urgent care after having some lower back pain and blood in his urine.  Patient states labs were done as well as a CT scan.  CT scan is what showed concerns regarding his liver.  PCP then referred him here to hepatology.    Last drink of alcohol was approximately 11/10/2023.  Was told in the past that his drinking was too heavy.  Has been drinking vodka daily.  At his heaviest use, was drinking up to 0.75 L of vodka daily.  No history of chemical dependency treatment for alcohol.    Admits to history of abusing opioids and then eventually IV heroin.  No IV drug use for over 10 years.  Has been clean from illicit drug use for over 10 years.  Did complete inpatient treatment x 2 for this.    Uses marijuana every 4 to 5 months.  Smokes approximately half pack cigarettes daily and has been smoking for the past 13 years.    Sees psych provider every 3 months typically.  This is the provider that prescribes Suboxone and sertraline.    Typically checks his blood sugars 2 times daily.  Was diagnosed with type 2 diabetes over 1 year ago.  He is currently using long-acting  insulin.    Health Maintenance:  Dexa scan: none  Hep A/B vaccines: has immunity to Hep B; recommended Hep A vaccine  Colonoscopy: none     Medical hx Surgical hx   Past Medical History:   Diagnosis Date    Depressive disorder     Opioid abuse (H)     Tobacco abuse     Urethral stricture     Past Surgical History:   Procedure Laterality Date    GENITOURINARY SURGERY      URETHRAL DILATION      x3    WISDOM TOOTH EXTRACTION                 Medications:     Current Outpatient Medications   Medication    blood glucose monitoring (SOFTCLIX) lancets    atorvastatin (LIPITOR) 10 MG tablet    blood glucose (NO BRAND SPECIFIED) lancets standard    blood glucose (NO BRAND SPECIFIED) test strip    blood glucose monitoring (ACCU-CHEK FE PLUS) meter device kit    buprenorphine HCl-naloxone HCl (SUBOXONE) 8-2 MG per film    gabapentin (NEURONTIN) 300 MG capsule    Insulin Glargine-yfgn 100 UNIT/ML SOPN    insulin pen needle (32G X 4 MM) 32G X 4 MM miscellaneous    lisinopril (ZESTRIL) 10 MG tablet    sertraline (ZOLOFT) 100 MG tablet     No current facility-administered medications for this visit.          Allergies:   No Known Allergies         Social History:     Social History     Socioeconomic History    Marital status: Single     Spouse name: Not on file    Number of children: Not on file    Years of education: Not on file    Highest education level: Not on file   Occupational History    Not on file   Tobacco Use    Smoking status: Every Day     Packs/day: 1.00     Years: 17.00     Additional pack years: 0.00     Total pack years: 17.00     Types: Cigarettes    Smokeless tobacco: Never   Vaping Use    Vaping Use: Never used   Substance and Sexual Activity    Alcohol use: Yes     Comment: couple beers or mixed drink daily    Drug use: Yes     Types: Marijuana     Comment: heroin (last date of use 10 years ago), uses marijauna once every few months    Sexual activity: Not on file   Other Topics Concern    Not on file    Social History Narrative    Not on file     Social Determinants of Health     Financial Resource Strain: Low Risk  (10/10/2023)    Financial Resource Strain     Within the past 12 months, have you or your family members you live with been unable to get utilities (heat, electricity) when it was really needed?: No   Food Insecurity: Low Risk  (10/10/2023)    Food Insecurity     Within the past 12 months, did you worry that your food would run out before you got money to buy more?: No     Within the past 12 months, did the food you bought just not last and you didn t have money to get more?: No   Transportation Needs: Low Risk  (10/10/2023)    Transportation Needs     Within the past 12 months, has lack of transportation kept you from medical appointments, getting your medicines, non-medical meetings or appointments, work, or from getting things that you need?: No   Physical Activity: Not on file   Stress: Not on file   Social Connections: Not on file   Interpersonal Safety: Low Risk  (10/10/2023)    Interpersonal Safety     Do you feel physically and emotionally safe where you currently live?: Yes     Within the past 12 months, have you been hit, slapped, kicked or otherwise physically hurt by someone?: No     Within the past 12 months, have you been humiliated or emotionally abused in other ways by your partner or ex-partner?: No   Housing Stability: Low Risk  (10/10/2023)    Housing Stability     Do you have housing? : Yes     Are you worried about losing your housing?: No          Family History:     Family History   Problem Relation Age of Onset    Breast Cancer Mother     Alcoholism Mother     Gout Father     Skin Cancer Father     Diverticulitis Father     Diabetes Maternal Grandmother     Diabetes Paternal Grandmother           Review of Systems:   GEN: See HPI          Physical Exam:     VS: no vitals taken for this visit  Gen: A&Ox3, NAD, well developed  HEENT: sclera non-icteric    Skin: No  "jaundice  Neuro: grossly intact  Psych: appropriate mood and affects         Data:   Reviewed in person and significant for:    Lab Results   Component Value Date     12/11/2023     10/20/2011      Lab Results   Component Value Date    POTASSIUM 4.0 12/11/2023    POTASSIUM 4.0 08/29/2021    POTASSIUM 4.1 10/20/2011     Lab Results   Component Value Date    CHLORIDE 98 12/11/2023    CHLORIDE 94 08/29/2021    CHLORIDE 104 10/20/2011     Lab Results   Component Value Date    CO2 26 12/11/2023    CO2 27 08/29/2021    CO2 29 10/20/2011     Lab Results   Component Value Date    BUN 6.6 12/11/2023    BUN 8 08/29/2021    BUN 10 10/20/2011     Lab Results   Component Value Date    CR 0.50 12/11/2023    CR 0.80 10/20/2011       Lab Results   Component Value Date    WBC 3.9 12/11/2023    WBC 7.8 10/20/2011     Lab Results   Component Value Date    HGB 14.8 12/11/2023    HGB 14.5 10/20/2011     Lab Results   Component Value Date    HCT 42.4 12/11/2023    HCT 42.6 10/20/2011     Lab Results   Component Value Date    MCV 91 12/11/2023    MCV 88 10/20/2011     Lab Results   Component Value Date    PLT 70 12/11/2023     10/20/2011     Lab Results   Component Value Date     12/11/2023    AST 20 10/20/2011     Lab Results   Component Value Date    ALT 94 12/11/2023    ALT 22 10/20/2011     No results found for: \"BILICONJ\"   Lab Results   Component Value Date    BILITOTAL 1.4 12/11/2023    BILITOTAL 0.3 10/20/2011     Lab Results   Component Value Date    ALBUMIN 5.3 12/11/2023    ALBUMIN 4.6 08/29/2021    ALBUMIN 4.4 10/20/2011     Lab Results   Component Value Date    PROTTOTAL 8.1 12/11/2023    PROTTOTAL 6.6 10/20/2011      Lab Results   Component Value Date    ALKPHOS 79 12/11/2023    ALKPHOS 63 10/20/2011     Lab Results   Component Value Date    INR 1.27 12/11/2023       Gisselle Back PA-C  "

## 2023-12-12 NOTE — PATIENT INSTRUCTIONS
Cirrhosis Education:    Nutrition  - For patients with cirrhosis, it is very important to eat the right types and amounts of foods. We recommend a diet low in carbohydrates/sugars and high in fresh fruits/vegetables, with the right amount of protein. We typically recommend 1.5gm/kg/day of protein, or  grams of protein every day.  - In regard to protein intake, you can focus on: meats (but limit red meat), cooked fish and seafood, vegetable-based protein meat products, tofu, eggs, legumes, dairy products (including milk and yogurt and cheese), unsalted nuts.  - You should eat at least three meals a day and three to four snacks between meals  - Bedtime snacks are especially important (preferrably something with some protein)    - Patients with malnutrition and/or loss of muscular mass can improve their nutrition and muscular mass by drinking two cans of dietary supplements daily, particularly at bedtime. These would include: Ensure, Boost, [place] Instant Milk, Glucerna, (or similar supplements)  - Please avoid eating raw seafood, especially shellfish, because of risk of serious illness  - We recommend all patients with cirrhosis limit their daily sodium intake to less than 2,000mg (2 grams)    General Liver Health  - Continue to avoid the use of all non-steroid anti-inflammatory medicines (ibuprofen, Aleve, naproxen, aspirin, etc.) as this can cause serious injury to your kidneys in the setting of liver disease.   - If you have pain, you can safely take up to 2 grams (aka 2,000 mg) of tylenol (aka acetaminophen) per day  - If you see a doctor and they prescribe you a new medication, please contact our clinic to let us know what changes are being made  - If you become acutely ill and present to an ER or are admitted to a hospital, please let us know as soon as you are able.  - We recommend that all patients with chronic liver disease be vaccinated against hepatitis A and B.  Please discuss with your primary  provider the need for these vaccines  - As patients with liver disease are at an increased risk of developing osteoporosis, we recommend that you have a DEXA scan with appropriate follow up and treatment.   - We recommend screening for Vitamin D deficiency (at least yearly) and replacement/supplementation as warranted.    Sleep Troubles:  - Sleep issues are very common in patients with chronic liver disease  - Recommend strict avoidance of medications such as narcotics, sedatives and sleep aids.    - Low dose melatonin can be used for insomnia, if needed    For other tips go to the Cirrhosis Care website: https://cirrhosiscare.ca/  - Click on the tab at the top 'patients & families' and then use the tabs at the top to navigate through resources from healthy living tips to symptom management

## 2023-12-13 LAB — AFP SERPL-MCNC: 5.4 NG/ML

## 2023-12-19 DIAGNOSIS — I10 BENIGN ESSENTIAL HYPERTENSION: ICD-10-CM

## 2023-12-19 RX ORDER — LISINOPRIL 10 MG/1
10 TABLET ORAL DAILY
Qty: 30 TABLET | Refills: 1 | Status: SHIPPED | OUTPATIENT
Start: 2023-12-19 | End: 2024-03-28

## 2023-12-19 NOTE — TELEPHONE ENCOUNTER
Pending Prescriptions:                       Disp   Refills    lisinopril (ZESTRIL) 10 MG tablet         30 tab*1            Sig: Take 1 tablet (10 mg) by mouth daily

## 2023-12-19 NOTE — TELEPHONE ENCOUNTER
"Requested Prescriptions   Pending Prescriptions Disp Refills    lisinopril (ZESTRIL) 10 MG tablet 30 tablet 1     Sig: Take 1 tablet (10 mg) by mouth daily       ACE Inhibitors (Including Combos) Protocol Failed - 12/19/2023 11:24 AM        Failed - Blood pressure under 140/90 in past 12 months     BP Readings from Last 3 Encounters:   10/10/23 (!) 140/96   03/14/23 (!) 142/94   09/03/21 112/74                 Failed - Normal serum creatinine on file in past 12 months     Recent Labs   Lab Test 12/11/23  1200   CR 0.50*       Ok to refill medication if creatinine is low          Passed - Recent (12 mo) or future (30 days) visit within the authorizing provider's specialty     Patient has had an office visit with the authorizing provider or a provider within the authorizing providers department within the previous 12 mos or has a future within next 30 days. See \"Patient Info\" tab in inbasket, or \"Choose Columns\" in Meds & Orders section of the refill encounter.              Passed - Medication is active on med list        Passed - Patient is age 18 or older        Passed - Normal serum potassium on file in past 12 months     Recent Labs   Lab Test 12/11/23  1200   POTASSIUM 4.0                  "

## 2024-01-15 ENCOUNTER — MYC MEDICAL ADVICE (OUTPATIENT)
Dept: FAMILY MEDICINE | Facility: CLINIC | Age: 34
End: 2024-01-15
Payer: COMMERCIAL

## 2024-01-15 DIAGNOSIS — F10.10 ETOH ABUSE: ICD-10-CM

## 2024-01-15 RX ORDER — GABAPENTIN 300 MG/1
CAPSULE ORAL
Qty: 101 CAPSULE | Refills: 0 | Status: CANCELLED | OUTPATIENT
Start: 2024-01-15 | End: 2024-04-17

## 2024-01-15 NOTE — TELEPHONE ENCOUNTER
Patient Quality Outreach    Patient is due for the following:   Hypertension -  Hypertension follow-up visit    Next Steps:   Schedule a office visit for Hypertension follow-up    Type of outreach:    Sent [employer] message.    Questions for provider review:    None     Yumi Hooks NP         98

## 2024-01-15 NOTE — TELEPHONE ENCOUNTER
Pending Prescriptions:                       Disp   Refills    gabapentin (NEURONTIN) 300 MG capsule     101 ca*0            Sig: Take 1 capsule (300 mg) by mouth every 6 hours           for 1 day, THEN 1 capsule (300 mg) every 8 hours           for 1 day, THEN 1 capsule (300 mg) every 12 hours           for 1 day, THEN 1 capsule (300 mg) daily as           needed for other (break through withdrawl           symptoms).

## 2024-01-15 NOTE — TELEPHONE ENCOUNTER
Patient has plenty of his gabapentin for now and will make appointment if he needs to continue this.  He states that his insulin is not stocked at his pharmacy and needs to be changed.  I called his pharmacy and they ordered in his current medication at the current dose so there is no need for change from the pharmacy standpoint.  Yumi Hooks NP on 1/15/2024 at 3:48 PM

## 2024-02-17 ENCOUNTER — HEALTH MAINTENANCE LETTER (OUTPATIENT)
Age: 34
End: 2024-02-17

## 2024-02-22 ENCOUNTER — MYC REFILL (OUTPATIENT)
Dept: FAMILY MEDICINE | Facility: CLINIC | Age: 34
End: 2024-02-22
Payer: COMMERCIAL

## 2024-02-22 DIAGNOSIS — E11.40 TYPE 2 DIABETES MELLITUS WITH DIABETIC NEUROPATHY, WITH LONG-TERM CURRENT USE OF INSULIN (H): ICD-10-CM

## 2024-02-22 DIAGNOSIS — Z79.4 TYPE 2 DIABETES MELLITUS WITH DIABETIC NEUROPATHY, WITH LONG-TERM CURRENT USE OF INSULIN (H): ICD-10-CM

## 2024-02-23 RX ORDER — INSULIN GLARGINE-YFGN 100 [IU]/ML
23 INJECTION, SOLUTION SUBCUTANEOUS AT BEDTIME
Qty: 27 ML | Refills: 0 | Status: SHIPPED | OUTPATIENT
Start: 2024-02-23 | End: 2024-03-24

## 2024-02-23 NOTE — TELEPHONE ENCOUNTER
Pending Prescriptions:                       Disp   Refills    Insulin Glargine-yfgn 100 UNIT/ML SOPN     27 mL  1        Sig: Inject 23 Units Subcutaneous at bedtime    Routing refill request to provider for review/approval because:  Drug not on the [facility] refill protocol, pt needing refill ASAP.  Routing to covering provider for review.    Marly Adams RN  [facility]

## 2024-03-25 ENCOUNTER — TELEPHONE (OUTPATIENT)
Dept: FAMILY MEDICINE | Facility: CLINIC | Age: 34
End: 2024-03-25
Payer: COMMERCIAL

## 2024-03-25 NOTE — TELEPHONE ENCOUNTER
Patient Quality Outreach    Patient is due for the following:   Diabetes -  Diabetic Follow-Up Visit    Next Steps:   Left message for patient to call clinic ,needs follow up dm in April     Type of outreach:    Phone, left message for patient/parent to call back.      Questions for provider review:    None           Coleman Turner

## 2024-03-28 ENCOUNTER — MYC REFILL (OUTPATIENT)
Dept: FAMILY MEDICINE | Facility: CLINIC | Age: 34
End: 2024-03-28
Payer: COMMERCIAL

## 2024-03-28 DIAGNOSIS — I10 BENIGN ESSENTIAL HYPERTENSION: ICD-10-CM

## 2024-03-29 RX ORDER — LISINOPRIL 10 MG/1
10 TABLET ORAL DAILY
Qty: 30 TABLET | Refills: 0 | Status: SHIPPED | OUTPATIENT
Start: 2024-03-29 | End: 2024-04-25 | Stop reason: DRUGHIGH

## 2024-04-25 ENCOUNTER — OFFICE VISIT (OUTPATIENT)
Dept: FAMILY MEDICINE | Facility: CLINIC | Age: 34
End: 2024-04-25
Payer: COMMERCIAL

## 2024-04-25 VITALS
HEART RATE: 95 BPM | RESPIRATION RATE: 16 BRPM | OXYGEN SATURATION: 95 % | DIASTOLIC BLOOD PRESSURE: 78 MMHG | BODY MASS INDEX: 28.33 KG/M2 | SYSTOLIC BLOOD PRESSURE: 152 MMHG | WEIGHT: 232.6 LBS | HEIGHT: 76 IN | TEMPERATURE: 97.2 F

## 2024-04-25 DIAGNOSIS — I10 BENIGN ESSENTIAL HYPERTENSION: Primary | ICD-10-CM

## 2024-04-25 DIAGNOSIS — R30.0 BURNING WITH URINATION: ICD-10-CM

## 2024-04-25 DIAGNOSIS — F11.21 OPIOID DEPENDENCE IN REMISSION (H): ICD-10-CM

## 2024-04-25 DIAGNOSIS — E11.40 TYPE 2 DIABETES MELLITUS WITH DIABETIC NEUROPATHY, WITH LONG-TERM CURRENT USE OF INSULIN (H): ICD-10-CM

## 2024-04-25 DIAGNOSIS — E78.5 HYPERLIPIDEMIA LDL GOAL <100: ICD-10-CM

## 2024-04-25 DIAGNOSIS — F33.41 RECURRENT MAJOR DEPRESSIVE DISORDER, IN PARTIAL REMISSION (H): ICD-10-CM

## 2024-04-25 DIAGNOSIS — Z79.4 TYPE 2 DIABETES MELLITUS WITH DIABETIC NEUROPATHY, WITH LONG-TERM CURRENT USE OF INSULIN (H): ICD-10-CM

## 2024-04-25 LAB
ALBUMIN UR-MCNC: NEGATIVE MG/DL
ANION GAP SERPL CALCULATED.3IONS-SCNC: 13 MMOL/L (ref 7–15)
APPEARANCE UR: CLEAR
BILIRUB UR QL STRIP: NEGATIVE
BUN SERPL-MCNC: 7.5 MG/DL (ref 6–20)
CALCIUM SERPL-MCNC: 9.3 MG/DL (ref 8.6–10)
CHLORIDE SERPL-SCNC: 101 MMOL/L (ref 98–107)
CHOLEST SERPL-MCNC: 163 MG/DL
COLOR UR AUTO: YELLOW
CREAT SERPL-MCNC: 0.47 MG/DL (ref 0.67–1.17)
DEPRECATED HCO3 PLAS-SCNC: 23 MMOL/L (ref 22–29)
EGFRCR SERPLBLD CKD-EPI 2021: >90 ML/MIN/1.73M2
FASTING STATUS PATIENT QL REPORTED: YES
GLUCOSE SERPL-MCNC: 240 MG/DL (ref 70–99)
GLUCOSE UR STRIP-MCNC: NEGATIVE MG/DL
HBA1C MFR BLD: 7.9 % (ref 0–5.6)
HDLC SERPL-MCNC: 30 MG/DL
HGB UR QL STRIP: NEGATIVE
KETONES UR STRIP-MCNC: NEGATIVE MG/DL
LDLC SERPL CALC-MCNC: 103 MG/DL
LEUKOCYTE ESTERASE UR QL STRIP: NEGATIVE
NITRATE UR QL: NEGATIVE
NONHDLC SERPL-MCNC: 133 MG/DL
PH UR STRIP: 6 [PH] (ref 5–7)
POTASSIUM SERPL-SCNC: 4.7 MMOL/L (ref 3.4–5.3)
SODIUM SERPL-SCNC: 137 MMOL/L (ref 135–145)
SP GR UR STRIP: 1.01 (ref 1–1.03)
TRIGL SERPL-MCNC: 150 MG/DL
UROBILINOGEN UR STRIP-ACNC: 4 E.U./DL

## 2024-04-25 PROCEDURE — 87591 N.GONORRHOEAE DNA AMP PROB: CPT | Performed by: NURSE PRACTITIONER

## 2024-04-25 PROCEDURE — 80048 BASIC METABOLIC PNL TOTAL CA: CPT | Performed by: NURSE PRACTITIONER

## 2024-04-25 PROCEDURE — 80061 LIPID PANEL: CPT | Performed by: NURSE PRACTITIONER

## 2024-04-25 PROCEDURE — 36415 COLL VENOUS BLD VENIPUNCTURE: CPT | Performed by: NURSE PRACTITIONER

## 2024-04-25 PROCEDURE — 87491 CHLMYD TRACH DNA AMP PROBE: CPT | Performed by: NURSE PRACTITIONER

## 2024-04-25 PROCEDURE — 81003 URINALYSIS AUTO W/O SCOPE: CPT | Performed by: NURSE PRACTITIONER

## 2024-04-25 PROCEDURE — 99214 OFFICE O/P EST MOD 30 MIN: CPT | Performed by: NURSE PRACTITIONER

## 2024-04-25 PROCEDURE — 83036 HEMOGLOBIN GLYCOSYLATED A1C: CPT | Performed by: NURSE PRACTITIONER

## 2024-04-25 RX ORDER — LISINOPRIL 20 MG/1
20 TABLET ORAL DAILY
Qty: 30 TABLET | Refills: 1 | Status: SHIPPED | OUTPATIENT
Start: 2024-04-25 | End: 2024-07-02

## 2024-04-25 ASSESSMENT — PATIENT HEALTH QUESTIONNAIRE - PHQ9
SUM OF ALL RESPONSES TO PHQ QUESTIONS 1-9: 3
10. IF YOU CHECKED OFF ANY PROBLEMS, HOW DIFFICULT HAVE THESE PROBLEMS MADE IT FOR YOU TO DO YOUR WORK, TAKE CARE OF THINGS AT HOME, OR GET ALONG WITH OTHER PEOPLE: NOT DIFFICULT AT ALL
SUM OF ALL RESPONSES TO PHQ QUESTIONS 1-9: 3

## 2024-04-25 ASSESSMENT — PAIN SCALES - GENERAL: PAINLEVEL: NO PAIN (0)

## 2024-04-25 NOTE — PATIENT INSTRUCTIONS
Bring urine back to lab.  Blood work today.  I will notify you on Mychart.  Increase your Lisinopril 20 mg daily and let me know what your blood pressure is at in 1 week.  Follow-up in 6 months for diabetic check if A1C is stable.  Follow-up in 1 month for lab due to increase in lisinopril dosing.

## 2024-04-25 NOTE — LETTER
May 2, 2024      Hieu Carson  [address] [address]        Dear ,    We are writing to inform you of your test results.    Your electrolytes show elevated blood glucose otherwise kidney function and electrolytes are normal.  Your lipid shows mild elevated triglycerides and bad cholesterol, and low good cholesterol.  Your A1C is at 7.9% which is good that it is under 8% but I would like to see tighter control.  Your urine is normal.       Please make a video appointment to discuss medication changes with your diabetes.    Your STD testing is negative.     Resulted Orders   UA Macroscopic with reflex to Microscopic and Culture - Clinic Collect   Result Value Ref Range    Color Urine Yellow Colorless, Straw, Light Yellow, Yellow    Appearance Urine Clear Clear    Glucose Urine Negative Negative mg/dL    Bilirubin Urine Negative Negative    Ketones Urine Negative Negative mg/dL    Specific Gravity Urine 1.010 1.003 - 1.035    Blood Urine Negative Negative    pH Urine 6.0 5.0 - 7.0    Protein Albumin Urine Negative Negative mg/dL    Urobilinogen Urine 4.0 (A) 0.2, 1.0 E.U./dL    Nitrite Urine Negative Negative    Leukocyte Esterase Urine Negative Negative    Narrative    Microscopic not indicated   NEISSERIA GONORRHOEA PCR   Result Value Ref Range    Neisseria gonorrhoeae Negative Negative      Comment:      Negative for N. gonorrhoeae rRNA by transcription mediated amplification. A negative result by transcription mediated amplification does not preclude the presence of C. trachomatis infection because results are dependent on proper and adequate collection, absence of inhibitors and sufficient rRNA to be detected.   CHLAMYDIA TRACHOMATIS PCR   Result Value Ref Range    Chlamydia trachomatis Negative Negative      Comment:      A negative result by transcription mediated amplification does not preclude the presence of C. trachomatis infection because results are dependent on proper and  adequate collection, absence of inhibitors and sufficient rRNA to be detected.   Basic metabolic panel  (Ca, Cl, CO2, Creat, Gluc, K, Na, BUN)   Result Value Ref Range    Sodium 137 135 - 145 mmol/L      Comment:      Reference intervals for this test were updated on 09/26/2023 to more accurately reflect our healthy population. There may be differences in the flagging of prior results with similar values performed with this method. Interpretation of those prior results can be made in the context of the updated reference intervals.     Potassium 4.7 3.4 - 5.3 mmol/L    Chloride 101 98 - 107 mmol/L    Carbon Dioxide (CO2) 23 22 - 29 mmol/L    Anion Gap 13 7 - 15 mmol/L    Urea Nitrogen 7.5 6.0 - 20.0 mg/dL    Creatinine 0.47 (L) 0.67 - 1.17 mg/dL    GFR Estimate >90 >60 mL/min/1.73m2    Calcium 9.3 8.6 - 10.0 mg/dL    Glucose 240 (H) 70 - 99 mg/dL   Lipid panel reflex to direct LDL Non-fasting   Result Value Ref Range    Cholesterol 163 <200 mg/dL    Triglycerides 150 (H) <150 mg/dL    Direct Measure HDL 30 (L) >=40 mg/dL    LDL Cholesterol Calculated 103 (H) <=100 mg/dL    Non HDL Cholesterol 133 (H) <130 mg/dL    Patient Fasting > 8hrs? Yes     Narrative    Cholesterol  Desirable:  <200 mg/dL    Triglycerides  Normal:  Less than 150 mg/dL  Borderline High:  150-199 mg/dL  High:  200-499 mg/dL  Very High:  Greater than or equal to 500 mg/dL    Direct Measure HDL  Female:  Greater than or equal to 50 mg/dL   Male:  Greater than or equal to 40 mg/dL    LDL Cholesterol  Desirable:  <100mg/dL  Above Desirable:  100-129 mg/dL   Borderline High:  130-159 mg/dL   High:  160-189 mg/dL   Very High:  >= 190 mg/dL    Non HDL Cholesterol  Desirable:  130 mg/dL  Above Desirable:  130-159 mg/dL  Borderline High:  160-189 mg/dL  High:  190-219 mg/dL  Very High:  Greater than or equal to 220 mg/dL   HEMOGLOBIN A1C   Result Value Ref Range    Hemoglobin A1C 7.9 (H) 0.0 - 5.6 %      Comment:      Normal <5.7%   Prediabetes 5.7-6.4%     Diabetes 6.5% or higher     Note: Adopted from ADA consensus guidelines.       If you have any questions or concerns, please call the clinic at the number listed above.       Sincerely,      Yumi Hooks NP

## 2024-04-26 LAB
C TRACH DNA SPEC QL NAA+PROBE: NEGATIVE
N GONORRHOEA DNA SPEC QL NAA+PROBE: NEGATIVE

## 2024-06-14 ENCOUNTER — MYC MEDICAL ADVICE (OUTPATIENT)
Dept: FAMILY MEDICINE | Facility: CLINIC | Age: 34
End: 2024-06-14
Payer: COMMERCIAL

## 2024-06-14 DIAGNOSIS — I10 BENIGN ESSENTIAL HYPERTENSION: Primary | ICD-10-CM

## 2024-06-14 NOTE — TELEPHONE ENCOUNTER
Patient Quality Outreach    Patient is due for the following:   Hypertension -  BP check    Next Steps:   Schedule a nurse only visit for blood pressure recheck lab only visit for lab for electrolytes and kidney function recheck    Type of outreach:    Sent [employer] message.    Next Steps:  Reach out within 90 days via Letter.    Max number of attempts reached: No. Will try again in 90 days if patient still on fail list.    Questions for provider review:    None           Yumi Hooks NP

## 2024-06-21 DIAGNOSIS — E11.40 TYPE 2 DIABETES MELLITUS WITH DIABETIC NEUROPATHY, WITH LONG-TERM CURRENT USE OF INSULIN (H): ICD-10-CM

## 2024-06-21 DIAGNOSIS — Z79.4 TYPE 2 DIABETES MELLITUS WITH DIABETIC NEUROPATHY, WITH LONG-TERM CURRENT USE OF INSULIN (H): ICD-10-CM

## 2024-07-01 ENCOUNTER — TELEPHONE (OUTPATIENT)
Dept: FAMILY MEDICINE | Facility: CLINIC | Age: 34
End: 2024-07-01
Payer: COMMERCIAL

## 2024-07-01 DIAGNOSIS — I10 BENIGN ESSENTIAL HYPERTENSION: ICD-10-CM

## 2024-07-02 RX ORDER — LISINOPRIL 20 MG/1
20 TABLET ORAL DAILY
Qty: 30 TABLET | Refills: 1 | OUTPATIENT
Start: 2024-07-02

## 2024-07-02 RX ORDER — LISINOPRIL 20 MG/1
20 TABLET ORAL DAILY
Qty: 30 TABLET | Refills: 0 | Status: SHIPPED | OUTPATIENT
Start: 2024-07-02 | End: 2024-08-08

## 2024-07-02 NOTE — TELEPHONE ENCOUNTER
BP check scheduled 7/9, pt requesting fill be considered to get to appt    Maria Eugenia Joseph on 7/2/2024 at 10:24 AM      .

## 2024-07-12 ENCOUNTER — MYC MEDICAL ADVICE (OUTPATIENT)
Dept: FAMILY MEDICINE | Facility: CLINIC | Age: 34
End: 2024-07-12
Payer: COMMERCIAL

## 2024-07-12 NOTE — TELEPHONE ENCOUNTER
Patient Quality Outreach    Patient is due for the following:   Diabetes -  Diabetic Follow-Up Visit  Hypertension -  Hypertension follow-up visit    Next Steps:   Schedule a office visit for diabetes and hypertension follow-up    Type of outreach:    Sent [employer] message.    Next Steps:  Reach out within 90 days via Letter.    Max number of attempts reached: No. Will try again in 90 days if patient still on fail list.    Questions for provider review:    None     Yumi Hooks NP

## 2024-07-31 DIAGNOSIS — E78.5 HYPERLIPIDEMIA LDL GOAL <70: ICD-10-CM

## 2024-07-31 RX ORDER — ATORVASTATIN CALCIUM 10 MG/1
10 TABLET, FILM COATED ORAL DAILY
Qty: 90 TABLET | Refills: 3 | OUTPATIENT
Start: 2024-07-31

## 2024-08-08 ENCOUNTER — MYC REFILL (OUTPATIENT)
Dept: FAMILY MEDICINE | Facility: CLINIC | Age: 34
End: 2024-08-08
Payer: COMMERCIAL

## 2024-08-08 DIAGNOSIS — I10 BENIGN ESSENTIAL HYPERTENSION: ICD-10-CM

## 2024-08-09 RX ORDER — LISINOPRIL 20 MG/1
20 TABLET ORAL DAILY
Qty: 30 TABLET | Refills: 0 | Status: SHIPPED | OUTPATIENT
Start: 2024-08-09 | End: 2024-09-11

## 2024-08-22 ENCOUNTER — MYC REFILL (OUTPATIENT)
Dept: FAMILY MEDICINE | Facility: CLINIC | Age: 34
End: 2024-08-22
Payer: COMMERCIAL

## 2024-08-22 DIAGNOSIS — Z79.4 TYPE 2 DIABETES MELLITUS WITH DIABETIC NEUROPATHY, WITH LONG-TERM CURRENT USE OF INSULIN (H): ICD-10-CM

## 2024-08-22 DIAGNOSIS — E11.40 TYPE 2 DIABETES MELLITUS WITH DIABETIC NEUROPATHY, WITH LONG-TERM CURRENT USE OF INSULIN (H): ICD-10-CM

## 2024-09-14 ENCOUNTER — HEALTH MAINTENANCE LETTER (OUTPATIENT)
Age: 34
End: 2024-09-14

## 2024-10-13 DIAGNOSIS — Z79.4 TYPE 2 DIABETES MELLITUS WITH DIABETIC NEUROPATHY, WITH LONG-TERM CURRENT USE OF INSULIN (H): ICD-10-CM

## 2024-10-13 DIAGNOSIS — E11.40 TYPE 2 DIABETES MELLITUS WITH DIABETIC NEUROPATHY, WITH LONG-TERM CURRENT USE OF INSULIN (H): ICD-10-CM

## 2024-10-15 RX ORDER — INSULIN GLARGINE-YFGN 100 [IU]/ML
28 INJECTION, SOLUTION SUBCUTANEOUS AT BEDTIME
Qty: 15 ML | Refills: 0 | Status: SHIPPED | OUTPATIENT
Start: 2024-10-15

## 2024-12-02 DIAGNOSIS — E11.40 TYPE 2 DIABETES MELLITUS WITH DIABETIC NEUROPATHY, WITH LONG-TERM CURRENT USE OF INSULIN (H): ICD-10-CM

## 2024-12-02 DIAGNOSIS — Z79.4 TYPE 2 DIABETES MELLITUS WITH DIABETIC NEUROPATHY, WITH LONG-TERM CURRENT USE OF INSULIN (H): ICD-10-CM

## 2024-12-02 RX ORDER — INSULIN GLARGINE-YFGN 100 [IU]/ML
28 INJECTION, SOLUTION SUBCUTANEOUS AT BEDTIME
Qty: 15 ML | Refills: 0 | Status: SHIPPED | OUTPATIENT
Start: 2024-12-02

## 2024-12-09 ENCOUNTER — MYC REFILL (OUTPATIENT)
Dept: FAMILY MEDICINE | Facility: CLINIC | Age: 34
End: 2024-12-09
Payer: COMMERCIAL

## 2024-12-09 DIAGNOSIS — I10 BENIGN ESSENTIAL HYPERTENSION: ICD-10-CM

## 2024-12-10 RX ORDER — LISINOPRIL 20 MG/1
20 TABLET ORAL DAILY
Qty: 90 TABLET | Refills: 0 | Status: SHIPPED | OUTPATIENT
Start: 2024-12-10

## 2025-01-11 ENCOUNTER — HEALTH MAINTENANCE LETTER (OUTPATIENT)
Age: 35
End: 2025-01-11

## 2025-01-13 ENCOUNTER — TELEPHONE (OUTPATIENT)
Dept: FAMILY MEDICINE | Facility: CLINIC | Age: 35
End: 2025-01-13

## 2025-01-13 NOTE — TELEPHONE ENCOUNTER
Patient Quality Outreach     Patient is due for the following:   Diabetes -  A1C, Eye Exam, Microalbumin, BP Check, Diabetic Follow-Up Visit, and Foot Exam  Hypertension -  Hypertension follow-up visit     Action(s) Taken:   Schedule a office visit for diabetes and blood pressure recheck     Type of outreach:    Please send out a letter to patient.     Questions for provider review:    None               Yumi Hooks, NP

## 2025-01-29 DIAGNOSIS — E11.40 TYPE 2 DIABETES MELLITUS WITH DIABETIC NEUROPATHY, WITH LONG-TERM CURRENT USE OF INSULIN (H): ICD-10-CM

## 2025-01-29 DIAGNOSIS — Z79.4 TYPE 2 DIABETES MELLITUS WITH DIABETIC NEUROPATHY, WITH LONG-TERM CURRENT USE OF INSULIN (H): ICD-10-CM

## 2025-01-30 RX ORDER — INSULIN GLARGINE-YFGN 100 [IU]/ML
INJECTION, SOLUTION SUBCUTANEOUS
Qty: 15 ML | Refills: 0 | Status: SHIPPED | OUTPATIENT
Start: 2025-01-30

## 2025-03-08 ENCOUNTER — HEALTH MAINTENANCE LETTER (OUTPATIENT)
Age: 35
End: 2025-03-08

## 2025-03-11 ENCOUNTER — MYC REFILL (OUTPATIENT)
Dept: FAMILY MEDICINE | Facility: CLINIC | Age: 35
End: 2025-03-11
Payer: COMMERCIAL

## 2025-03-11 DIAGNOSIS — I10 BENIGN ESSENTIAL HYPERTENSION: ICD-10-CM

## 2025-03-13 DIAGNOSIS — I10 BENIGN ESSENTIAL HYPERTENSION: ICD-10-CM

## 2025-03-13 RX ORDER — LISINOPRIL 20 MG/1
20 TABLET ORAL DAILY
Qty: 90 TABLET | Refills: 0 | Status: SHIPPED | OUTPATIENT
Start: 2025-03-13

## 2025-03-13 RX ORDER — LISINOPRIL 20 MG/1
20 TABLET ORAL DAILY
Qty: 90 TABLET | Refills: 0 | OUTPATIENT
Start: 2025-03-13

## 2025-04-20 ENCOUNTER — HEALTH MAINTENANCE LETTER (OUTPATIENT)
Age: 35
End: 2025-04-20

## 2025-04-22 ENCOUNTER — TELEPHONE (OUTPATIENT)
Dept: FAMILY MEDICINE | Facility: CLINIC | Age: 35
End: 2025-04-22

## 2025-04-22 ENCOUNTER — OFFICE VISIT (OUTPATIENT)
Dept: FAMILY MEDICINE | Facility: CLINIC | Age: 35
End: 2025-04-22
Payer: COMMERCIAL

## 2025-04-22 VITALS
OXYGEN SATURATION: 96 % | BODY MASS INDEX: 26.11 KG/M2 | WEIGHT: 210 LBS | TEMPERATURE: 97.4 F | HEART RATE: 99 BPM | HEIGHT: 75 IN | DIASTOLIC BLOOD PRESSURE: 68 MMHG | SYSTOLIC BLOOD PRESSURE: 102 MMHG | RESPIRATION RATE: 16 BRPM

## 2025-04-22 DIAGNOSIS — I10 BENIGN ESSENTIAL HYPERTENSION: ICD-10-CM

## 2025-04-22 DIAGNOSIS — Z79.4 TYPE 2 DIABETES MELLITUS WITH OTHER SPECIFIED COMPLICATION, WITH LONG-TERM CURRENT USE OF INSULIN (H): Primary | ICD-10-CM

## 2025-04-22 DIAGNOSIS — E11.69 TYPE 2 DIABETES MELLITUS WITH OTHER SPECIFIED COMPLICATION, WITH LONG-TERM CURRENT USE OF INSULIN (H): Primary | ICD-10-CM

## 2025-04-22 DIAGNOSIS — K70.31 ALCOHOLIC CIRRHOSIS OF LIVER WITH ASCITES (H): ICD-10-CM

## 2025-04-22 PROBLEM — F10.230 ALCOHOL DEPENDENCE WITH WITHDRAWAL, UNCOMPLICATED (H): Status: ACTIVE | Noted: 2025-04-16

## 2025-04-22 PROBLEM — G47.9 SLEEP DISTURBANCE: Status: ACTIVE | Noted: 2025-04-18

## 2025-04-22 LAB
ANION GAP SERPL CALCULATED.3IONS-SCNC: 14 MMOL/L (ref 7–15)
BUN SERPL-MCNC: 11.2 MG/DL (ref 6–20)
CALCIUM SERPL-MCNC: 10.5 MG/DL (ref 8.8–10.4)
CHLORIDE SERPL-SCNC: 97 MMOL/L (ref 98–107)
CHOLEST SERPL-MCNC: 156 MG/DL
CREAT SERPL-MCNC: 0.59 MG/DL (ref 0.67–1.17)
EGFRCR SERPLBLD CKD-EPI 2021: >90 ML/MIN/1.73M2
EST. AVERAGE GLUCOSE BLD GHB EST-MCNC: 143 MG/DL
FASTING STATUS PATIENT QL REPORTED: NO
GLUCOSE SERPL-MCNC: 163 MG/DL (ref 70–99)
GLUCOSE SERPL-MCNC: 163 MG/DL (ref 70–99)
HBA1C MFR BLD: 6.6 % (ref 0–5.6)
HCO3 SERPL-SCNC: 23 MMOL/L (ref 22–29)
HDLC SERPL-MCNC: 27 MG/DL
INR PPP: 1.42 (ref 0.85–1.15)
LDLC SERPL CALC-MCNC: 107 MG/DL
NONHDLC SERPL-MCNC: 129 MG/DL
POTASSIUM SERPL-SCNC: 4.3 MMOL/L (ref 3.4–5.3)
SODIUM SERPL-SCNC: 134 MMOL/L (ref 135–145)
TRIGL SERPL-MCNC: 109 MG/DL

## 2025-04-22 PROCEDURE — 36415 COLL VENOUS BLD VENIPUNCTURE: CPT | Performed by: NURSE PRACTITIONER

## 2025-04-22 PROCEDURE — 80061 LIPID PANEL: CPT | Performed by: NURSE PRACTITIONER

## 2025-04-22 PROCEDURE — 85610 PROTHROMBIN TIME: CPT | Performed by: NURSE PRACTITIONER

## 2025-04-22 PROCEDURE — 3074F SYST BP LT 130 MM HG: CPT | Performed by: NURSE PRACTITIONER

## 2025-04-22 PROCEDURE — 99214 OFFICE O/P EST MOD 30 MIN: CPT | Performed by: NURSE PRACTITIONER

## 2025-04-22 PROCEDURE — 99000 SPECIMEN HANDLING OFFICE-LAB: CPT | Performed by: NURSE PRACTITIONER

## 2025-04-22 PROCEDURE — 86341 ISLET CELL ANTIBODY: CPT | Mod: 90 | Performed by: NURSE PRACTITIONER

## 2025-04-22 PROCEDURE — 84681 ASSAY OF C-PEPTIDE: CPT | Performed by: NURSE PRACTITIONER

## 2025-04-22 PROCEDURE — 83036 HEMOGLOBIN GLYCOSYLATED A1C: CPT | Performed by: NURSE PRACTITIONER

## 2025-04-22 PROCEDURE — 1126F AMNT PAIN NOTED NONE PRSNT: CPT | Performed by: NURSE PRACTITIONER

## 2025-04-22 PROCEDURE — 80048 BASIC METABOLIC PNL TOTAL CA: CPT | Performed by: NURSE PRACTITIONER

## 2025-04-22 PROCEDURE — 3078F DIAST BP <80 MM HG: CPT | Performed by: NURSE PRACTITIONER

## 2025-04-22 PROCEDURE — 1111F DSCHRG MED/CURRENT MED MERGE: CPT | Performed by: NURSE PRACTITIONER

## 2025-04-22 RX ORDER — INSULIN GLARGINE-YFGN 100 [IU]/ML
34 INJECTION, SOLUTION SUBCUTANEOUS AT BEDTIME
Qty: 15 ML | Refills: 1 | Status: SHIPPED | OUTPATIENT
Start: 2025-04-22

## 2025-04-22 RX ORDER — TRAZODONE HYDROCHLORIDE 50 MG/1
50 TABLET ORAL
COMMUNITY
Start: 2025-04-21 | End: 2025-05-21

## 2025-04-22 RX ORDER — ACYCLOVIR 400 MG/1
1 TABLET ORAL ONCE
Qty: 1 EACH | Refills: 0 | Status: SHIPPED | OUTPATIENT
Start: 2025-04-22 | End: 2025-04-22

## 2025-04-22 RX ORDER — ACAMPROSATE CALCIUM 333 MG/1
666 TABLET, DELAYED RELEASE ORAL
COMMUNITY
Start: 2025-04-21 | End: 2026-04-21

## 2025-04-22 RX ORDER — LACTULOSE 10 G/15ML
20 SOLUTION ORAL
COMMUNITY
Start: 2025-04-21

## 2025-04-22 RX ORDER — LISINOPRIL 20 MG/1
10 TABLET ORAL DAILY
COMMUNITY
Start: 2025-04-22

## 2025-04-22 RX ORDER — ACYCLOVIR 400 MG/1
1 TABLET ORAL
Qty: 9 EACH | Refills: 5 | Status: SHIPPED | OUTPATIENT
Start: 2025-04-22

## 2025-04-22 ASSESSMENT — PATIENT HEALTH QUESTIONNAIRE - PHQ9
10. IF YOU CHECKED OFF ANY PROBLEMS, HOW DIFFICULT HAVE THESE PROBLEMS MADE IT FOR YOU TO DO YOUR WORK, TAKE CARE OF THINGS AT HOME, OR GET ALONG WITH OTHER PEOPLE: SOMEWHAT DIFFICULT
SUM OF ALL RESPONSES TO PHQ QUESTIONS 1-9: 8
SUM OF ALL RESPONSES TO PHQ QUESTIONS 1-9: 8

## 2025-04-22 ASSESSMENT — PAIN SCALES - GENERAL: PAINLEVEL_OUTOF10: NO PAIN (0)

## 2025-04-22 NOTE — PATIENT INSTRUCTIONS
[facility] center in [place], [place]:  [place]  [phone]  Watch BP at home and cut tabs of lisinopril in half (10 mg) daily.  Labs today.  If diabetes is type 1 I will refer to endocrinology.  Make an appointment with diabetic education.  Get your CGM and start using this.  If blood sugars in the morning are under 150, do not increase your insulin.  Watch blood sugars in the morning, before a meal, after your biggest meal of the day and at bedtime.  Write these down.  Insulin should be taken at bedtime.  I will get [place] paperwork done by the end of the week and you will be contacted for .

## 2025-04-22 NOTE — TELEPHONE ENCOUNTER
Forms/Letter Request      Forms faxed, patient notified. Copy sent to scanning.     Type of form/letter: Disability attending physician statement     Do we have the form/letter: Yes: received completed from provider    Who is the form from? [facility]    Where did/will the form come from? Patient or family brought in       When is form/letter needed by: asap    How would you like the form/letter returned: Fax : [phone]    Patient Notified form requests are processed in 5-7 business days:Yes    Could we send this information to you in [employer] or would you prefer to receive a phone call?:   Patient would prefer a phone call   Okay to leave a detailed message?: Yes at Cell number on file:    Telephone Information:   Mobile [phone]     ROBBY Hernandez

## 2025-04-22 NOTE — PROGRESS NOTES
Assessment & Plan     Type 2 diabetes mellitus with other specified complication, with long-term current use of insulin (H)  Advised on yearly eye exams.  Ordered Lipid, LONG, C-peptide, and glucose today.  Discussed that if he is having lows that he is over treating his diabetes with the long acting insulin.  I advised to check blood sugar after 12 hours of taking the Semglee and if under 150 and having low blood sugars to decrease 2 units.  I ordered a CGM for him today as well as labs to rule out insulin deficiency for correct diagnosis.  I also referred him to diabetic education to see how he is doing with blood sugars throughout the day.  I advised that he should check blood sugars in the morning, at night, before a meal and 30-45 minutes after his biggest meal of the day.  Plan to refer to endocrinology if not able to get blood sugars under control.  Plan to follow-up 3-6 months pending A1C and labs.  - Adult Eye  Referral; Future  - Lipid panel reflex to direct LDL Fasting; Future  - Glutamic acid decarboxylase antibody; Future  - C-peptide; Future  - Glucose; Future  - Hemoglobin A1c; Future  - Insulin Glargine-yfgn (SEMGLEE, YFGN,) 100 UNIT/ML SOPN; Inject 34 Units subcutaneously at bedtime.  - Continuous Glucose  (DEXCOM G7 ) TALHA; 1 each once for 1 dose. Use to read blood sugars as per manufacturers instructions  - Continuous Glucose Sensor (DEXCOM G7 SENSOR) MISC; 1 each every 10 days. Change sensor every 10 days  - Amb Adult Diabetes Educator Referral - Routine; Future  - Lipid panel reflex to direct LDL Fasting  - Glutamic acid decarboxylase antibody  - C-peptide  - Glucose    Alcoholic cirrhosis of liver with ascites (H)  Referral placed to GI for liver disease.  INR ordered for recheck.  - Adult GI  Referral - Consult Only; Future  - INR; Future  - INR    Benign essential hypertension  BP is low, advised reducing lisinopril to 10 mg daily and montior BP at home.   BMP ordered for monitoring electrolytes and kidney function.    - Basic metabolic panel  (Ca, Cl, CO2, Creat, Gluc, K, Na, BUN)  - lisinopril (ZESTRIL) 20 MG tablet; Take 0.5 tablets (10 mg) by mouth daily. NEED LABS for further refills      MED REC REQUIRED  Post Medication Reconciliation Status: discharge medications reconciled and changed, per note/orders    See AVS for patient instructions.    Maurilio Hagen is a 34 year old, presenting for the following health issues:  Hypertension, Diabetes, Lipids, Referral (Gi dr for liver ), ER F/U, and fmla (Forms for work )        4/22/2025    10:50 AM   Additional Questions   Roomed by rmb   Accompanied by mother         4/22/2025   Forms   Any forms needing to be completed Yes         4/22/2025    10:50 AM   Patient Reported Additional Medications   Patient reports taking the following new medications none     History of Present Illness       Diabetes:   He presents for follow up of diabetes.  He is checking home blood glucose two times daily.   He checks blood glucose before and after meals.  Blood glucose is sometimes over 200 and never under 70.  When his blood glucose is low, the patient is asymptomatic for confusion, blurred vision, lethargy and reports not feeling dizzy, shaky, or weak.  He is concerned about other.   He is having numbness in feet.  The patient has not had a diabetic eye exam in the last 12 months.          Hypertension: He presents for follow up of hypertension.  He does not check blood pressure  regularly outside of the clinic. Outside blood pressures have been over 140/90. He does not follow a low salt diet.     Reason for visit:  Diabetes check up and follow up with primary after recent hospitalization    He eats 0-1 servings of fruits and vegetables daily.He consumes 1 sweetened beverage(s) daily.He exercises with enough effort to increase his heart rate 10 to 19 minutes per day.  He exercises with enough effort to increase his heart rate  3 or less days per week. He is missing 1 dose(s) of medications per week.  He is not taking prescribed medications regularly due to remembering to take.      Patient is concerned since blood sugars did go up in the hospital.  He also states that he has been increasing his long acting insulin but having some lows and blood sugars are still getting up to 160-200 at times.    Hyperlipidemia Follow-Up    Are you regularly taking any medication or supplement to lower your cholesterol?   No   Are you having muscle aches or other side effects that you think could be caused by your cholesterol lowering medication?  No    BP Readings from Last 2 Encounters:   04/22/25 102/68   04/25/24 (!) 152/78     Hemoglobin A1C (%)   Date Value   04/25/2024 7.9 (H)   10/10/2023 6.5 (H)     LDL Cholesterol Calculated (mg/dL)   Date Value   04/25/2024 103 (H)   03/14/2023 120 (H)     Hospital Follow-up Visit:    Hospital/Nursing Home/ Rehab Facility:  [facility]  Date of Admission: 04/16/2025  Date of Discharge: 04/21/2025  Reason(s) for Admission: alcoholic hepatitis  Was the patient in the ICU or did the patient experience delirium during hospitalization?  No  Do you have any other stressors you would like to discuss with your provider? Job Concerns and Health Concerns    Problems taking medications regularly:  None  Medication changes since discharge: None  Problems adhering to non-medication therapy:  None    Summary of hospitalization:  CareEverywhere information obtained and reviewed  Diagnostic Tests/Treatments reviewed.  Follow up needed: none  Other Healthcare Providers Involved in Patient s Care:          Has referral for addiction counseling, working on getting inpatient vs. Outpatient treatment (looking around), requesting endocrinology appointment for diabetes  Update since discharge: stable.  Still has some tremors but today is the first day he felt that he did not need the benzodiazepine medication for these.    Plan  "of care communicated with patient and family    Patient is currently living with his parents.  They have set up a breathalyzer for his parents to check him and also in the car too so that he cannot drive unless he is sober.       Review of Systems  CONSTITUTIONAL: NEGATIVE for fever, chills, change in weight  RESP: NEGATIVE for significant cough or SOB  CV: NEGATIVE for chest pain, palpitations or peripheral edema  NEURO: POSITIVE for tremor mild today  PSYCHIATRIC: NEGATIVE for changes in mood or affect  ROS otherwise negative      Objective    /68   Pulse 99   Temp 97.4  F (36.3  C) (Tympanic)   Resp 16   Ht 1.905 m (6' 3\")   Wt 95.3 kg (210 lb)   SpO2 96%   BMI 26.25 kg/m    Body mass index is 26.25 kg/m .  Physical Exam   GENERAL: alert and no distress  RESP: lungs clear to auscultation - no rales, rhonchi or wheezes  CV: regular rate and rhythm, normal S1 S2, no S3 or S4, no murmur, click or rub, no peripheral edema  PSYCH: mentation appears normal, affect normal/bright        Signed Electronically by: Yumi Hooks NP    "

## 2025-04-23 ENCOUNTER — PATIENT OUTREACH (OUTPATIENT)
Dept: CARE COORDINATION | Facility: CLINIC | Age: 35
End: 2025-04-23
Payer: COMMERCIAL

## 2025-04-23 LAB
C PEPTIDE SERPL-MCNC: 4.2 NG/ML (ref 0.9–6.9)
FASTING STATUS PATIENT QL REPORTED: YES

## 2025-04-26 ENCOUNTER — MYC MEDICAL ADVICE (OUTPATIENT)
Dept: FAMILY MEDICINE | Facility: CLINIC | Age: 35
End: 2025-04-26
Payer: COMMERCIAL

## 2025-04-27 NOTE — TELEPHONE ENCOUNTER
Was able to identify forms were received for patient but they are illegible. Notified patient to please resend forms.

## 2025-04-28 ENCOUNTER — MYC MEDICAL ADVICE (OUTPATIENT)
Dept: FAMILY MEDICINE | Facility: CLINIC | Age: 35
End: 2025-04-28
Payer: COMMERCIAL

## 2025-04-29 ENCOUNTER — TRANSFERRED RECORDS (OUTPATIENT)
Dept: ADMINISTRATIVE | Facility: CLINIC | Age: 35
End: 2025-04-29
Payer: COMMERCIAL

## 2025-04-29 ENCOUNTER — MYC MEDICAL ADVICE (OUTPATIENT)
Dept: FAMILY MEDICINE | Facility: CLINIC | Age: 35
End: 2025-04-29
Payer: COMMERCIAL

## 2025-04-29 NOTE — TELEPHONE ENCOUNTER
Faxed completed paperwork to [employer].  Original form placed in cabinet drawer.    Lucia Stroud on 4/29/2025 at 2:10 PM

## 2025-04-29 NOTE — TELEPHONE ENCOUNTER
Form completed and signed.  Faxed form to [employer].  Sent [employer] message letting pt know this had been done on message dated 4/29.  Original form placed in cabinet drawer.      Lucia Stroud on 4/29/2025 at 2:18 PM

## 2025-05-01 ENCOUNTER — VIRTUAL VISIT (OUTPATIENT)
Dept: EDUCATION SERVICES | Facility: CLINIC | Age: 35
End: 2025-05-01
Attending: NURSE PRACTITIONER
Payer: COMMERCIAL

## 2025-05-01 DIAGNOSIS — Z79.4 TYPE 2 DIABETES MELLITUS WITH OTHER SPECIFIED COMPLICATION, WITH LONG-TERM CURRENT USE OF INSULIN (H): ICD-10-CM

## 2025-05-01 DIAGNOSIS — E11.69 TYPE 2 DIABETES MELLITUS WITH OTHER SPECIFIED COMPLICATION, WITH LONG-TERM CURRENT USE OF INSULIN (H): ICD-10-CM

## 2025-05-01 PROCEDURE — G0108 DIAB MANAGE TRN  PER INDIV: HCPCS | Mod: 95 | Performed by: NUTRITIONIST

## 2025-05-01 NOTE — NURSING NOTE
Current patient location: [place]    Is the patient currently in the state of [place]? YES    Visit mode: VIDEO    If the visit is dropped, the patient can be reconnected by:VIDEO VISIT: Text to cell phone:   Telephone Information:   Mobile [phone]       Will anyone else be joining the visit? NO  (If patient encounters technical issues they should call [phone] :[phone])    Are changes needed to the allergy or medication list? E-check in was reviewed/completed for today's visit. VF did not review e-check in information again with Hieu due to this.       Are refills needed on medications prescribed by this physician? Discuss with provider    Rooming Documentation:  Questionnaire(s) not done per department protocol    Reason for visit: Diabetes Education    Yeni PLATT

## 2025-05-01 NOTE — PROCEDURES
[facility]   [address] [place] [address], [address], [place] [address]     Patient Name: Hieu Carson  Gender:  Male  Exam Date: 04/29/2025 Visit Number:  [id number]  Age: 34 Years YOB: 1990  Attending MD: Casey Eid MD Medical Record#:  [id number]  -----------------------------------------------------------------------------------------------------------------------------   Procedure:    Upper GI Endoscopy   Indications:    Varices, follow up  Provider:        Casey Eid MD   Referring MD: Yumi MCLEOD   Primary MD:      Yumi MCLEOD  Medications:   Admitting Medication:   0.9% Normal Saline at [facility]   Intra Procedure Medications:   Patient received monitored anesthesia care.     Complications: No immediate complications  ___________________________________________________________________________________________  Procedure:   An examination of the heart and lungs was performed within acceptable limits.  . The patient was therefore deemed a reasonable candidate for sedation.   The risks and benefits were explained to the patient, who appeared to understand. After obtaining informed consent, the scope was passed under direct vision. Throughout the procedure the patient's blood pressure, pulse and oxygen saturations were monitored.  The scope was introduced through the mouth and advanced to the second portion of duodenum.         Findings:   Esophagus:  The z-line is 39 centimeters from the incisors.  Top of the gastric folds is 39 centimeters from the incisors.    Small esophageal varices (less than 5 mm in diameter). Number of vessels - 3. No stigmata of recent bleed present. No active bleed present.     Esophagitis. Location - distal esophagus. Description  - Reflux. LA Classification - Grade A.  Stomach:    H. Pylori biopsies taken.   The diaphragm hiatus is at 39 centimeters from the incisors.     Gastritis.  Location - entire stomach. Description - erythema  - mild.  Duodenum:    Normal duodenum.  Impression:   Secondary esophageal varices without bleeding  Portal hypertensive gastropathy  Gastroesophageal reflux disease with esophagitis without hemorrhage  MD Impression Comments:  Small varices, too small to band.     Preliminary Plan:  Repeat EGD in 1 year.  Recommendation Comments:  Continue to abstain from alcohol.  Omeprazole 20 mg once daily for reflux esophagitis.  Pathology Results:  A: STOMACH, BIOPSY:           1. Normal gastric antral and body mucosae           2. Negative for Helicobacter      MICROSCOPIC  A: Performed     Electronically signed by: DELFINA Clements MD    Interpreted at [facility], [address] [place] [id number]    Orders    Diagnostics:  Procedure Comments Timeframe Assessment   EGD  1 Year I85.10     Follow-up visit/Referral:  Order Comments   follow-up visit for Adult Hepatology Clinic      If your health care provider has asked for a follow-up visit, your appointment will be scheduled with a nurse practitioner or physician assistant on your provider's care team, unless noted above.        _Electronically signed by:___________________  Casey iEd MD                 04/29/2025    cc: Yumi MCLEOD  cc: Yumi MCLEOD

## 2025-05-01 NOTE — PROGRESS NOTES
Joined the call at 5/1/2025, 2:04:23 pm.  Left the call at 5/1/2025, 2:58:34 pm.  You were on the call for 54 minutes 11 seconds.    Diabetes Self-Management Education & Support    Hieu Carson presents today for education related to Type 2 diabetes    He is accompanied by self    Year of diagnosis: summer of 2021  Referring provider:  Yumi Hooks    PATIENT CONCERNS/REASON FOR REFERRAL   Newly sober. Working on getting better control of his glucose.       ASSESSMENT:    Taking Medication:     Current Diabetes Management per Patient:  Taking diabetes medications? yes:     Diabetes Medication(s)       Insulin       Insulin Glargine-yfgn (SEMGLEE, YFGN,) 100 UNIT/ML SOPN Inject 34 Units subcutaneously at bedtime.            Monitoring        Patient's most recent   Lab Results   Component Value Date    A1C 6.6 04/22/2025      Patient's A1C goal: <7.0    Activity: not assessed    Healthy Eating:   Not assessed. We will discuss next time.      Problem Solving:      Patient is at risk of hypoglycemia?: YES  Hospitalizations for hyper or hypoglycemia: Yes        EDUCATION and INSTRUCTION PROVIDED AT THIS VISIT:       First visit with patient today.    Monitoring was discussed including glucose goals. No glucose reports to review today. Patient has been using a reader but was hoping to use an marion. Wasn't sure if that was an option. He will download after visit today and get that set up so we can look at it together at follow up.     We discussed pathophys of type 1, type 2 and ALEX. Patient is newly sober and looking into some more support options. Still adjusting physically and he is wondering if this is having an affect on his glucose. Would recommend referral to Endocrinologist. I will ask referring provider about this.     We discussed function of both long and rapid acting insulin. Discussed how to dose rapid acting insulin using fixed or insulin to carb ratio and correction scale. Patient was wondering if he  "might need meal time insulin, but review of his navdeep reports will be helpful to see if we could just adjust the long acting insulin versus adding meal-time insulin.     Patient-stated goal written and given to Hieu Carson.  Verbalized and demonstrated understanding of instructions.     PLAN:  Follow up in one week  We will review navdeep reports at that time and also discuss diet   Endo referral  See patient instructions  AVS printed and given to patient    FOLLOW-UP:    One week      Any diabetes medication initiation or dose changes were made via the CDCES Standing Orders per the patient's referring provider. A copy of this encounter was shared with the provider.      Subjective/Objective  Hieu is an 34 year old, presenting for the following diabetes education related to:    Diabetes education in the past 24mo: (Patient-Rptd) No  Diabetes type: (Patient-Rptd) Type 2  Disease course: (Patient-Rptd) Getting harder to manage  How confident are you filling out medical forms by yourself:: (Patient-Rptd) Quite a bit  Cultural Influences/Ethnic Background:  Not  or       Diabetes Symptoms & Complications:  Diabetes Related Symptoms: (Patient-Rptd) Fatigue, Polyuria (increased urination), Slow healing wounds  Weight trend: (Patient-Rptd) Stable  Symptom course: (Patient-Rptd) Stable  Disease course: (Patient-Rptd) Getting harder to manage       Patient Problem List and Family Medical History reviewed for relevant medical history, current medical status, and diabetes risk factors.    Vitals:  There were no vitals taken for this visit.  Estimated body mass index is 26.25 kg/m  as calculated from the following:    Height as of 4/22/25: 1.905 m (6' 3\").    Weight as of 4/22/25: 95.3 kg (210 lb).   Last 3 BP:   BP Readings from Last 3 Encounters:   04/22/25 102/68   04/25/24 (!) 152/78   10/10/23 (!) 140/96       History   Smoking Status    Every Day    Packs/day: 1.00    Years: 17.00    Types: Cigarettes " "  Smokeless Tobacco    Never       Labs:  Lab Results   Component Value Date    A1C 6.6 04/22/2025     Lab Results   Component Value Date     04/22/2025     04/22/2025     08/29/2021     10/20/2011     Lab Results   Component Value Date     04/22/2025     Direct Measure HDL   Date Value Ref Range Status   04/22/2025 27 (L) >=40 mg/dL Final     GFR Estimate   Date Value Ref Range Status   04/22/2025 >90 >60 mL/min/1.73m2 Final     Comment:     eGFR calculated using 2021 CKD-EPI equation.   02/14/2020 >60 >60 mL/min/1.73m2 Final   10/20/2011 >90 >60 mL/min/1.7m2 Final     GFR Estimate If Black   Date Value Ref Range Status   02/14/2020 >60 >60 mL/min/1.73m2 Final   10/20/2011 >90 >60 mL/min/1.7m2 Final     Lab Results   Component Value Date    CR 0.59 04/22/2025    CR 0.80 10/20/2011     No results found for: \"MICROL\", \"UMALCR\", \"UCRR\"      Answers submitted by the patient for this visit:  Questionnaire about: Diabetes problem (Submitted on 5/1/2025)  Chief Complaint: Diabetes problem    "

## 2025-05-05 DIAGNOSIS — Z79.4 TYPE 2 DIABETES MELLITUS WITH OTHER SPECIFIED COMPLICATION, WITH LONG-TERM CURRENT USE OF INSULIN (H): Primary | ICD-10-CM

## 2025-05-05 DIAGNOSIS — E11.69 TYPE 2 DIABETES MELLITUS WITH OTHER SPECIFIED COMPLICATION, WITH LONG-TERM CURRENT USE OF INSULIN (H): Primary | ICD-10-CM

## 2025-05-06 ENCOUNTER — PATIENT OUTREACH (OUTPATIENT)
Dept: CARE COORDINATION | Facility: CLINIC | Age: 35
End: 2025-05-06
Payer: COMMERCIAL

## 2025-05-08 ENCOUNTER — PATIENT OUTREACH (OUTPATIENT)
Dept: CARE COORDINATION | Facility: CLINIC | Age: 35
End: 2025-05-08
Payer: COMMERCIAL

## 2025-05-15 ENCOUNTER — TELEPHONE (OUTPATIENT)
Dept: ENDOCRINOLOGY | Facility: CLINIC | Age: 35
End: 2025-05-15
Payer: COMMERCIAL

## 2025-05-15 NOTE — TELEPHONE ENCOUNTER
Left Voicemail (1st Attempt) and Sent [facility] [employer] (1st Attempt) for the patient to call back and schedule the following:    Appointment type: NEW DIABETES  Provider: Jose J  Return date: next available, currently 06/04  Specialty phone number: [phone]  Additional appointment(s) needed: N/A  Additonal Notes: Jenny JUNG    Available appts:  06/04 in person FZ  06/10 in person FZ  06/11 in person FZ  06/17 in person FZ  07/14 12/3:00 virtual [facility]'s clinic schedule:            Every Monday in [place]: In-person in the morning, Virtual in the afternoon            Every Tuesday in [place]: In-Person all day            Every Wednesday in [place]: In-person all day.            Every Thursday in [place]: All-day in person            Every Friday in [place]: Virtual in the morning (no afternoon clinic)  Full days run 7:30am-4pm  Friday half days run 7:30am-11:30am  Please use only the HOLD slots and not the ADMIN slots.    Milli White on 5/15/2025 at 4:17 PM

## 2025-05-20 ENCOUNTER — TELEPHONE (OUTPATIENT)
Dept: EDUCATION SERVICES | Facility: CLINIC | Age: 35
End: 2025-05-20
Payer: COMMERCIAL

## 2025-05-20 ENCOUNTER — PATIENT OUTREACH (OUTPATIENT)
Dept: CARE COORDINATION | Facility: CLINIC | Age: 35
End: 2025-05-20
Payer: COMMERCIAL

## 2025-05-20 NOTE — PROGRESS NOTES
Diabetes Consult Note  May 21, 2025        Hieu Carson  is a 34 year old male with Type 2 Diabetes here to establish care. He also has a past medical history of cirrhosis, HTN, alcohol dependence with withdrawal, opiate dependence on suboxone, polysubstance abuse, Depressive disorder, Opioid abuse (H), Tobacco abuse, and Urethral stricture.           HPI:  Referred by PCP Dr. Yumi Hooks.    Type 2 Diabetes was diagnosed summer 2021. Had significant weight loss and checked blood glucose with grandmother's glucometer and was 401 mg/dL, went to ER and given insulin and discharged. Took metformin for 1 week, then treatment was changed to insulin. Has been on basal insulin only since insulin therapy initiated.     No history of pancreatitis found in chart review, however patient thinks he may have a positive history of mild pancreatitis.   C-Peptide 4.2 on 4/22/2025 with glucose 163.  LONG negative 4/22/2025.    Family members with diabetes:  Maternal Grandmother with Type 2 Diabetes, Paternal Grandmother Type 2 Diabetes. More distant relatives on mom's side. Paternal uncle with Type 2.    He recently underwent inpatient detox for alcohol abuse at [facility].  Back at work since May 7th approx. 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 Long commute.    Living with parents currently, good support, good relationship.     Hx +alcoholic cirrhosis with ascites. Gastro appointment at [facility] in July.    Hx bulbous urethral stricture, still with occasional stinging with urination.     He reports for 1 week he has had increase urination at night. He notes this coincides with new afternoon caffeine beverage, wonders if it could be related.      Current Diabetes Medications:  Semglee 38-40 units once daily at bedtime. Varies dose slightly based on elevations throughout the day.      We reviewed glucometer/CGMS data together.  It revealed:  Dexcom G7 with 1 week of  data.  TIR 53%  High 27%  Very High 10%  No lows.  GMI 7.7%  Overall pattern: At goal overnight with prolonged elevations with meals. Some overnight elevations attributed late-night snacking per patient provided history.           History of DKA:  No    History of hospitalizations for diabetes:  Never    Ability to sense low blood sugars:  Intact, starts at 100.     History of Diabetes monitoring and complications/ prevention:  CAD: no  Last eye exam results: 1 month ago at [facility] per pt, with Tatyana Jones and Alejandro, no DR.  Last dental exam: Not discussed  Neuropathy: + neuropathy in toes with numbness.  Nephropathy: No, on lisinopril.  HTN: BP at goal.   On statin: Recently stopped by PCP.  Depression: Recently sober, not currently following with therapy, good support at home per patient.  Feet: No ulcers or lesions per patient.    Hieu's PMH, PSH and allergies were reviewed today and pertinent information is summarized above.    Hieu's pertinent social and family history are also reviewed today and pertinent information is summarized above.    Current Outpatient Medications   Medication Sig Dispense Refill    acamprosate (CAMPRAL) 333 MG EC tablet Take 666 mg by mouth.      atorvastatin (LIPITOR) 10 MG tablet Take 1 tablet (10 mg) by mouth daily 90 tablet 3    blood glucose (NO BRAND SPECIFIED) lancets standard Use to test blood sugar 2  times daily or as directed. 100 each 1    blood glucose (NO BRAND SPECIFIED) test strip Use to test blood sugar 2 times daily or as directed. 200 strip 1    blood glucose monitoring (ACCU-CHEK FE PLUS) meter device kit Use to test blood sugar 2 times daily or as directed. 1 kit 0    blood glucose monitoring (SOFTCLIX) lancets USE TO TEST BLOOD SUGAR TWO TIMES DAILY OR AS DIRECTED.      buprenorphine HCl-naloxone HCl (SUBOXONE) 8-2 MG per film Place 1 Film under the tongue 2 times daily      Continuous Glucose Sensor (DEXCOM G7 SENSOR) MISC 1 each every 10  "days. Change sensor every 10 days 9 each 5    Insulin Glargine-yfgn (SEMGLEE, YFGN,) 100 UNIT/ML SOPN Inject 34 Units subcutaneously at bedtime. 15 mL 1    insulin pen needle (32G X 4 MM) 32G X 4 MM miscellaneous Use insulin pen needles daily or as directed. 100 each 1    lactulose (CHRONULAC) 10 GM/15ML solution Take 20 g by mouth.      lisinopril (ZESTRIL) 20 MG tablet Take 0.5 tablets (10 mg) by mouth daily. NEED LABS for further refills      melatonin 3 MG tablet       sertraline (ZOLOFT) 100 MG tablet Take 100 mg by mouth daily      traZODone (DESYREL) 50 MG tablet Take 50 mg by mouth.       No current facility-administered medications for this visit.         ROS:   Reports good physical activity tolerance.  Denies any pedal lesions or vision changes or concerns. Denies any other acute concerns except as noted above.      Exam:    /76   Pulse 90   Ht 1.899 m (6' 2.75\")   Wt 97.1 kg (214 lb)   BMI 26.93 kg/m    Wt Readings from Last 10 Encounters:   05/21/25 97.1 kg (214 lb)   04/22/25 95.3 kg (210 lb)   04/25/24 105.5 kg (232 lb 9.6 oz)   10/10/23 103.9 kg (229 lb)   03/14/23 106.5 kg (234 lb 12.8 oz)   09/03/21 80.2 kg (176 lb 12.8 oz)   08/29/21 76.9 kg (169 lb 8 oz)   08/29/21 76.8 kg (169 lb 5 oz)   08/19/16 96.7 kg (213 lb 3.2 oz)     Estimated body mass index is 26.25 kg/m  as calculated from the following:    Height as of 4/22/25: 1.905 m (6' 3\").    Weight as of 4/22/25: 95.3 kg (210 lb).    Physical Exam:  General: Pleasant, well nourished and hydrated male in NAD.   Psych:  Mood is \"good,\" affect is appropriate.  Thought form and content are fluid and coherent.    HEENT: Eyes and sclera are clear. Extraocular movements are grossly intact without proptosis.    Neck: No masses or JVD are noted.    Resp: Easy and unlabored breathing. Lungs CTA  Cardiovascular: RRR  Neuro: Alert and oriented, communicating clearly.   Ext: no swelling or edema.    Diabetic foot exam: normal DP and PT pulses and " no trophic changes or ulcerative lesions. Abnormal monofilament today with decreased sensation to bilateral toes.      Data:      Recent Labs   Lab Test 04/22/25  1218 04/25/24  1001 12/11/23  1200 10/10/23  1023 03/14/23  1602   A1C 6.6* 7.9*  --  6.5* 6.3*   TSH  --   --   --   --  1.54   * 103*  --   --  120*   HDL 27* 30*  --   --  41   TRIG 109 150*  --   --  131   CR 0.59* 0.47* 0.50*  --  0.57*   AST  --   --  112* 134* 123*   ALT  --   --  94* 109* 136*     Lab Results   Component Value Date    CPEPT 4.2 04/22/2025    GADAB <5.0 04/22/2025     Cholesterol   Date Value Ref Range Status   04/22/2025 156 <200 mg/dL Final   04/25/2024 163 <200 mg/dL Final       Hemoglobin   Date Value Ref Range Status   12/11/2023 14.8 13.3 - 17.7 g/dL Final   10/20/2011 14.5 13.3 - 17.7 g/dL Final   ]      Most recent GFRs:  GFR Estimate   Date Value Ref Range Status   04/22/2025 >90 >60 mL/min/1.73m2 Final     Comment:     eGFR calculated using 2021 CKD-EPI equation.   04/25/2024 >90 >60 mL/min/1.73m2 Final   12/11/2023 >90 >60 mL/min/1.73m2 Final   02/14/2020 >60 >60 mL/min/1.73m2 Final   10/20/2011 >90 >60 mL/min/1.7m2 Final   06/12/2011 >90 >60 mL/min/1.7m2 Final   03/27/2011 >90 >60 mL/min/1.7m2 Final       Lab Results   Component Value Date    CPEPT 4.2 04/22/2025    GADAB <5.0 04/22/2025     AST   Date Value Ref Range Status   12/11/2023 112 (H) 0 - 45 U/L Final     Comment:     Reference intervals for this test were updated on 6/12/2023 to more accurately reflect our healthy population. There may be differences in the flagging of prior results with similar values performed with this method. Interpretation of those prior results can be made in the context of the updated reference intervals.   10/20/2011 20 0 - 55 U/L Final     Lab Results   Component Value Date    ALT 94 12/11/2023    ALT 22 10/20/2011             Assessment/Plan:    Type 2 Diabetes with peripheral neuropathy, with long-term use of insulin.  Blood  glucose control: Not at goal with recent GMI 7.7%, however this only represents one week of blood glucose data. A1C of 6.6 on 4/22 reflects period of alcohol abuse in prior 3 months.  Today we made the plan to continue Semglee 38 units once daily at bedtime. Treatment options limited in the setting of cirrhosis. SGLT2i could be considered but patient has complicated urologic history with urethral stricture. In addition, his current long commute may make increased urinary frequency unworkable, as well as 1 week of nocturia being of concern (see below).     Bolus insulin therapy likely indicated in the near future but will defer today in favor of simplified regimen of basal insulin only, with consideration to new sobriety, newly back to work, and limited CGM data supporting fairly well controlled blood glucose.    Question of pancreatogenic diabetes. No pancreatitis history found in chart review but patient thinks he may have had pancreatitis in the past during a time of alcohol abuse. Recent labs support Type 2 DM diagnosis with normal C-Peptide and negative LONG Ab April 2025. Family history of Type 2 Diabetes.     DM Complications-   Retinopathy:  No. Up to date with eye exam, next due April 2024.   Nephropathy:  BP at goal today. Due to check microalbuminuria.  Creatinine stable.   Neuropathy: Yes. Abnormal monofilament today with decreased sensation to bilateral toes.  Feet: OK, no ulcers or lesions.   Lipids: Per patient atorvastatin discontinued by PCP last month, removed from med list today.     2. Alcoholic cirrhosis of liver with ascites  Appointment with hepatology GI in July.    3. Depression  Per patient has good support at home, declines referral to Health Psychology today. He will consider for the future, information provided in AVS.    4. Hypertension  BP at goal today. Managed by PCP.    5. Nocturia  New problem x1 week, doubt symptom of hyperglycemia as blood glucose fairly well controlled. Counseled  patient to shift afternoon caffeinated beverage a little earlier in the day to see if this helps, if not to notify PCP.    Follow up 2 months.      74 minutes spent on the date of the encounter doing chart review, history and exam, documentation, education and counseling, as well as communication and coordination of care, and further activities as noted above.  This time excludes time spent reviewing CGM.    The longitudinal plan of care for the diagnosis(es)/condition(s) as documented were addressed during this visit. Due to the added complexity in care, I will continue to support Hieu in the subsequent management and with ongoing continuity of care.    It is my privilege to be involved in the care of the above patient.     MARCUS Gonzalez, CNP

## 2025-05-21 ENCOUNTER — OFFICE VISIT (OUTPATIENT)
Dept: ENDOCRINOLOGY | Facility: CLINIC | Age: 35
End: 2025-05-21
Attending: NURSE PRACTITIONER
Payer: COMMERCIAL

## 2025-05-21 VITALS
BODY MASS INDEX: 26.61 KG/M2 | HEART RATE: 90 BPM | SYSTOLIC BLOOD PRESSURE: 124 MMHG | HEIGHT: 75 IN | WEIGHT: 214 LBS | DIASTOLIC BLOOD PRESSURE: 76 MMHG

## 2025-05-21 DIAGNOSIS — I10 BENIGN ESSENTIAL HYPERTENSION: ICD-10-CM

## 2025-05-21 DIAGNOSIS — E11.69 TYPE 2 DIABETES MELLITUS WITH OTHER SPECIFIED COMPLICATION, WITH LONG-TERM CURRENT USE OF INSULIN (H): Primary | ICD-10-CM

## 2025-05-21 DIAGNOSIS — F33.41 RECURRENT MAJOR DEPRESSIVE DISORDER, IN PARTIAL REMISSION: ICD-10-CM

## 2025-05-21 DIAGNOSIS — Z79.4 TYPE 2 DIABETES MELLITUS WITH OTHER SPECIFIED COMPLICATION, WITH LONG-TERM CURRENT USE OF INSULIN (H): Primary | ICD-10-CM

## 2025-05-21 DIAGNOSIS — K70.31 ALCOHOLIC CIRRHOSIS OF LIVER WITH ASCITES (H): ICD-10-CM

## 2025-05-21 DIAGNOSIS — R35.1 NOCTURIA: ICD-10-CM

## 2025-05-21 PROCEDURE — 3078F DIAST BP <80 MM HG: CPT | Performed by: NURSE PRACTITIONER

## 2025-05-21 PROCEDURE — 99205 OFFICE O/P NEW HI 60 MIN: CPT | Mod: 25 | Performed by: NURSE PRACTITIONER

## 2025-05-21 PROCEDURE — 3074F SYST BP LT 130 MM HG: CPT | Performed by: NURSE PRACTITIONER

## 2025-05-21 PROCEDURE — 95251 CONT GLUC MNTR ANALYSIS I&R: CPT | Performed by: NURSE PRACTITIONER

## 2025-05-21 RX ORDER — INSULIN GLARGINE-YFGN 100 [IU]/ML
38 INJECTION, SOLUTION SUBCUTANEOUS AT BEDTIME
Qty: 15 ML | Refills: 1 | Status: SHIPPED | OUTPATIENT
Start: 2025-05-21

## 2025-05-21 NOTE — LETTER
5/21/2025      Hieu Carson  [address] [id number]      Dear Colleague,    Thank you for referring your patient, Hieu Carson, to the [facility]. Please see a copy of my visit note below.             Diabetes Consult Note  May 21, 2025        Hieu Carson  is a 34 year old male with Type 2 Diabetes here to establish care. He also has a past medical history of cirrhosis, HTN, alcohol dependence with withdrawal, opiate dependence on suboxone, polysubstance abuse, Depressive disorder, Opioid abuse (H), Tobacco abuse, and Urethral stricture.           HPI:  Referred by PCP Dr. Yumi Hooks.    Type 2 Diabetes was diagnosed summer 2021. Had significant weight loss and checked blood glucose with grandmother's glucometer and was 401 mg/dL, went to ER and given insulin and discharged. Took metformin for 1 week, then treatment was changed to insulin. Has been on basal insulin only since insulin therapy initiated.     No history of pancreatitis found in chart review, however patient thinks he may have a positive history of mild pancreatitis.   C-Peptide 4.2 on 4/22/2025 with glucose 163.  LONG negative 4/22/2025.    Family members with diabetes:  Maternal Grandmother with Type 2 Diabetes, Paternal Grandmother Type 2 Diabetes. More distant relatives on mom's side. Paternal uncle with Type 2.    He recently underwent inpatient detox for alcohol abuse at [facility].  Back at work since May 7th approx. Works for [employer], makes 3-D printed parts. Long commute.    Living with parents currently, good support, good relationship.     Hx +alcoholic cirrhosis with ascites. Gastro appointment at [facility] in July.    Hx bulbous urethral stricture, still with occasional stinging with urination.     He reports for 1 week he has had increase urination at night. He notes this coincides with new afternoon caffeine beverage, wonders if it could be  related.      Current Diabetes Medications:  Semglee 38-40 units once daily at bedtime. Varies dose slightly based on elevations throughout the day.      We reviewed glucometer/CGMS data together.  It revealed:  Dexcom G7 with 1 week of data.  TIR 53%  High 27%  Very High 10%  No lows.  GMI 7.7%  Overall pattern: At goal overnight with prolonged elevations with meals. Some overnight elevations attributed late-night snacking per patient provided history.           History of DKA:  No    History of hospitalizations for diabetes:  Never    Ability to sense low blood sugars:  Intact, starts at 100.     History of Diabetes monitoring and complications/ prevention:  CAD: no  Last eye exam results: 1 [facility] ago at [facility] in [place] per pt, with Tatyana Jones and Alejandro, no DR.  Last dental exam: Not discussed  Neuropathy: + neuropathy in toes with numbness.  Nephropathy: No, on lisinopril.  HTN: BP at goal.   On statin: Recently stopped by PCP.  Depression: Recently sober, not currently following with therapy, good support at home per patient.  Feet: No ulcers or lesions per patient.    Hieu's PMH, PSH and allergies were reviewed today and pertinent information is summarized above.    Hieu's pertinent social and family history are also reviewed today and pertinent information is summarized above.    Current Outpatient Medications   Medication Sig Dispense Refill     acamprosate (CAMPRAL) 333 MG EC tablet Take 666 mg by mouth.       atorvastatin (LIPITOR) 10 MG tablet Take 1 tablet (10 mg) by mouth daily 90 tablet 3     blood glucose (NO BRAND SPECIFIED) lancets standard Use to test blood sugar 2  times daily or as directed. 100 each 1     blood glucose (NO BRAND SPECIFIED) test strip Use to test blood sugar 2 times daily or as directed. 200 strip 1     blood glucose monitoring (ACCU-CHEK FE PLUS) meter device kit Use to test blood sugar 2 times daily or as directed. 1 kit 0     blood glucose monitoring (SOFTCLIX)  "lancets USE TO TEST BLOOD SUGAR TWO TIMES DAILY OR AS DIRECTED.       buprenorphine HCl-naloxone HCl (SUBOXONE) 8-2 MG per film Place 1 Film under the tongue 2 times daily       Continuous Glucose Sensor (DEXCOM G7 SENSOR) MISC 1 each every 10 days. Change sensor every 10 days 9 each 5     Insulin Glargine-yfgn (SEMGLEE, YFGN,) 100 UNIT/ML SOPN Inject 34 Units subcutaneously at bedtime. 15 mL 1     insulin pen needle (32G X 4 MM) 32G X 4 MM miscellaneous Use insulin pen needles daily or as directed. 100 each 1     lactulose (CHRONULAC) 10 GM/15ML solution Take 20 g by mouth.       lisinopril (ZESTRIL) 20 MG tablet Take 0.5 tablets (10 mg) by mouth daily. NEED LABS for further refills       melatonin 3 MG tablet        sertraline (ZOLOFT) 100 MG tablet Take 100 mg by mouth daily       traZODone (DESYREL) 50 MG tablet Take 50 mg by mouth.       No current facility-administered medications for this visit.         ROS:   Reports good physical activity tolerance.  Denies any pedal lesions or vision changes or concerns. Denies any other acute concerns except as noted above.      Exam:    /76   Pulse 90   Ht 1.899 m (6' 2.75\")   Wt 97.1 kg (214 lb)   BMI 26.93 kg/m    Wt Readings from Last 10 Encounters:   05/21/25 97.1 kg (214 lb)   04/22/25 95.3 kg (210 lb)   04/25/24 105.5 kg (232 lb 9.6 oz)   10/10/23 103.9 kg (229 lb)   03/14/23 106.5 kg (234 lb 12.8 oz)   09/03/21 80.2 kg (176 lb 12.8 oz)   08/29/21 76.9 kg (169 lb 8 oz)   08/29/21 76.8 kg (169 lb 5 oz)   08/19/16 96.7 kg (213 lb 3.2 oz)     Estimated body mass index is 26.25 kg/m  as calculated from the following:    Height as of 4/22/25: 1.905 m (6' 3\").    Weight as of 4/22/25: 95.3 kg (210 lb).    Physical Exam:  General: Pleasant, well nourished and hydrated male in NAD.   Psych:  Mood is \"good,\" affect is appropriate.  Thought form and content are fluid and coherent.    HEENT: Eyes and sclera are clear. Extraocular movements are grossly intact without " proptosis.    Neck: No masses or JVD are noted.    Resp: Easy and unlabored breathing. Lungs CTA  Cardiovascular: RRR  Neuro: Alert and oriented, communicating clearly.   Ext: no swelling or edema.    Diabetic foot exam: normal DP and PT pulses and no trophic changes or ulcerative lesions. Abnormal monofilament today with decreased sensation to bilateral toes.      Data:      Recent Labs   Lab Test 04/22/25  1218 04/25/24  1001 12/11/23  1200 10/10/23  1023 03/14/23  1602   A1C 6.6* 7.9*  --  6.5* 6.3*   TSH  --   --   --   --  1.54   * 103*  --   --  120*   HDL 27* 30*  --   --  41   TRIG 109 150*  --   --  131   CR 0.59* 0.47* 0.50*  --  0.57*   AST  --   --  112* 134* 123*   ALT  --   --  94* 109* 136*     Lab Results   Component Value Date    CPEPT 4.2 04/22/2025    GADAB <5.0 04/22/2025     Cholesterol   Date Value Ref Range Status   04/22/2025 156 <200 mg/dL Final   04/25/2024 163 <200 mg/dL Final       Hemoglobin   Date Value Ref Range Status   12/11/2023 14.8 13.3 - 17.7 g/dL Final   10/20/2011 14.5 13.3 - 17.7 g/dL Final   ]      Most recent GFRs:  GFR Estimate   Date Value Ref Range Status   04/22/2025 >90 >60 mL/min/1.73m2 Final     Comment:     eGFR calculated using 2021 CKD-EPI equation.   04/25/2024 >90 >60 mL/min/1.73m2 Final   12/11/2023 >90 >60 mL/min/1.73m2 Final   02/14/2020 >60 >60 mL/min/1.73m2 Final   10/20/2011 >90 >60 mL/min/1.7m2 Final   06/12/2011 >90 >60 mL/min/1.7m2 Final   03/27/2011 >90 >60 mL/min/1.7m2 Final       Lab Results   Component Value Date    CPEPT 4.2 04/22/2025    GADAB <5.0 04/22/2025     AST   Date Value Ref Range Status   12/11/2023 112 (H) 0 - 45 U/L Final     Comment:     Reference intervals for this test were updated on 6/12/2023 to more accurately reflect our healthy population. There may be differences in the flagging of prior results with similar values performed with this method. Interpretation of those prior results can be made in the context of the updated  reference intervals.   10/20/2011 20 0 - 55 U/L Final     Lab Results   Component Value Date    ALT 94 12/11/2023    ALT 22 10/20/2011             Assessment/Plan:    Type 2 Diabetes with peripheral neuropathy, with long-term use of insulin.  Blood glucose control: Not at goal with recent GMI 7.7%, however this only represents one week of blood glucose data. A1C of 6.6 on 4/22 reflects period of alcohol abuse in prior 3 months.  Today we made the plan to continue Semglee 38 units once daily at bedtime. Treatment options limited in the setting of cirrhosis. SGLT2i could be considered but patient has complicated urologic history with urethral stricture. In addition, his current long commute may make increased urinary frequency unworkable, as well as 1 week of nocturia being of concern (see below).     Bolus insulin therapy likely indicated in the near future but will defer today in favor of simplified regimen of basal insulin only, with consideration to new sobriety, newly back to work, and limited CGM data supporting fairly well controlled blood glucose.    Question of pancreatogenic diabetes. No pancreatitis history found in chart review but patient thinks he may have had pancreatitis in the past during a time of alcohol abuse. Recent labs support Type 2 DM diagnosis with normal C-Peptide and negative LONG Ab April 2025. Family history of Type 2 Diabetes.     DM Complications-   Retinopathy:  No. Up to date with eye exam, next due April 2024.   Nephropathy:  BP at goal today. Due to check microalbuminuria.  Creatinine stable.   Neuropathy: Yes. Abnormal monofilament today with decreased sensation to bilateral toes.  Feet: OK, no ulcers or lesions.   Lipids: Per patient atorvastatin discontinued by PCP last month, removed from med list today.     2. Alcoholic cirrhosis of liver with ascites  Appointment with hepatology GI in July.    3. Depression  Per patient has good support at home, declines referral to Health  Psychology today. He will consider for the future, information provided in AVS.    4. Hypertension  BP at goal today. Managed by PCP.    5. Nocturia  New problem x1 week, doubt symptom of hyperglycemia as blood glucose fairly well controlled. Counseled patient to shift afternoon caffeinated beverage a little earlier in the day to see if this helps, if not to notify PCP.    Follow up 2 months.      74 minutes spent on the date of the encounter doing chart review, history and exam, documentation, education and counseling, as well as communication and coordination of care, and further activities as noted above.  This time excludes time spent reviewing CGM.    The longitudinal plan of care for the diagnosis(es)/condition(s) as documented were addressed during this visit. Due to the added complexity in care, I will continue to support Hieu in the subsequent management and with ongoing continuity of care.    It is my privilege to be involved in the care of the above patient.     MARCUS Gonzalez, CNP              Again, thank you for allowing me to participate in the care of your patient.        Sincerely,        RICHARD Gonzalez CNP    Electronically signed

## 2025-05-21 NOTE — Clinical Note
My Depression Action Plan  Name: Hieu Carson   Date of Birth 1990  Date: 5/21/2025    My doctor: Yumi Hooks   [facility] [address]  [phone]          GREEN    ZONE   Good Control    What it looks like:     Things are going generally well. You have normal ups and downs. You may even feel depressed from time to time, but bad moods usually last less than a day.   What you need to do:  1. Continue to care for yourself (see self care plan)  2. Check your depression survival kit and update it as needed  3. Follow your physician s recommendations including any medication.  4. Do not stop taking medication unless you consult with your physician first.           YELLOW         ZONE Getting Worse    What it looks like:     Depression is starting to interfere with your life.     It may be hard to get out of bed; you may be starting to isolate yourself from others.    Symptoms of depression are starting to last most all day and this has happened for several days.     You may have suicidal thoughts but they are not constant.   What you need to do:     1. Call your care team. Your response to treatment will improve if you keep your care team informed of your progress. Yellow periods are signs an adjustment may need to be made.     2. Continue your self-care.  Just get dressed and ready for the day.  Don't give yourself time to talk yourself out of it.    3. Talk to someone in your support network.    4. Open up your Depression Self-Care Plan/Wellness Kit.           RED    ZONE Medical Alert - Get Help    What it looks like:     Depression is seriously interfering with your life.     You may experience these or other symptoms: You can t get out of bed most days, can t work or engage in other necessary activities, you have trouble taking care of basic hygiene, or basic responsibilities, thoughts of suicide or death that will not  go away, self-injurious behavior.     What you need to do:  1. Call your care team and request a same-day appointment. If they are not available (weekends or after hours) call your local crisis line, emergency room or 911.          Depression Self-Care Plan / Wellness Kit    Many people find that medication and therapy are helpful treatments for managing depression. In addition, making small changes to your everyday life can help to boost your mood and improve your wellbeing. Below are some tips for you to consider. Be sure to talk with your medical provider and/or behavioral health consultant if your symptoms are worsening or not improving.     Sleep   Sleep hygiene  means all of the habits that support good, restful sleep. It includes maintaining a consistent bedtime and wake time, using your bedroom only for sleeping or sex, and keeping the bedroom dark and free of distractions like a computer, smartphone, or television.     Develop a Healthy Routine  Maintain good hygiene. Get out of bed in the morning, make your bed, brush your teeth, take a shower, and get dressed. Don t spend too much time viewing media that makes you feel stressed. Find time to relax each day.    Exercise  Get some form of exercise every day. This will help reduce pain and release endorphins, the  feel good  chemicals in your brain. It can be as simple as just going for a walk or doing some gardening, anything that will get you moving.      Diet  Strive to eat healthy foods, including fruits and vegetables. Drink plenty of water. Avoid excessive sugar, caffeine, alcohol, and other mood-altering substances.     Stay Connected with Others  Stay in touch with friends and family members.    Manage Your Mood  Try deep breathing, massage therapy, biofeedback, or meditation. Take part in fun activities when you can. Try to find something to smile about each day.     Psychotherapy  Be open to working with a therapist if your provider recommends it.      Medication  Be sure to take your medication as prescribed. Most anti-depressants need to be taken every day. It usually takes several weeks for medications to work. Not all medicines work for all people. It is important to follow-up with your provider to make sure you have a treatment plan that is working for you. Do not stop your medication abruptly without first discussing it with your provider.    Crisis Resources   These hotlines are for both adults and children. They and are open 24 hours a day, 7 days a week unless noted otherwise.      National Suicide Prevention Lifeline   988 or 1-162-172-HQQN (9618)      Crisis Text Line    www.crisistextline.org  Text HOME to 596221 from anywhere in the [place], anytime, about any type of crisis. A live, trained crisis counselor will receive the text and respond quickly.      [facility] Lifeline for LGBTQ Youth  A national crisis intervention and suicide lifeline for LGBTQ youth under 25. Provides a safe place to talk without judgement. Call 1-879.115.9376; text START to 126773 or visit www.thetrevorproject.org to talk to a trained counselor.      For [facility] crisis numbers, visit the [facility] website at:  https://[place].gov/dhs/people-we-serve/adults/health-care/mental-health/resources/crisis-contacts.jsp

## 2025-05-21 NOTE — PATIENT INSTRUCTIONS
Nice to meet you today!    Please continue Semglee 38 units once daily at bedtime.     Continue to follow with Kalli Beauchamp, Diabetes Educator.     Please complete lab.        Call Dexcom if a sensor isn't working or falls off:    Important information for new patients:     Welcome to Endocrinology!  We look forward to partnering with you in managing your diabetes.  Here are a few basic things to know about how our clinic operates.       [employer]hart: Morphy is the easiest way to communicate with your care team, receive test results, and review your after-visit summaries and clinic notes.  Please create a Morphy account if you haven t already.  The phone marion is also very easy to use.  Clinic staff can help you set this up.      Glucose data: Please upload or share your sensor, pump, meter data or send your In-pen report 1-3 days prior to your appointment.  One of our clinic facilitators will reach out to you in the days before your appointment to ensure you know how to share your data. This allows us to review your data before your appointment.     Labs: A1c every 3 months (standing order).  Fasting labs once a year (fasting lipids, microalbumin, basic metabolic panel, other labs as needed). Lab results will be sent to you through Morphy, or a letter will be mailed to you. Lab scheduling number- [phone]    Eye exam: Please schedule once a year for a dilated eye exam. Please let me know if you need a referral.     Refills: If you need prescription refills, please reach out to your pharmacy.  At your clinic visit, we recommend you ask for refills of all of your diabetes supplies/insulin and we will give you one year s worth of refills.     Primary care: Please schedule an annual physical with your primary care provider.  Although diabetes is likely your main medical concern, it is important to have an established provider for preventive care and to manage issues that may arise.  Please let us know if you do not  have a PCP, and we will make a recommendation.     Financial concerns: We understand that living with diabetes can be expensive.  Many patients will feel financial stress as a result. Please let us know if you have difficulty in affording your treatments, as assistance may be available.      -Hypoglycemia (low glucose):   If glucose is less than 70 mg/dL, treat with 15g carb (4 glucose tablets), recheck glucose in 15 minutes.  If low again, repeat.   If glucose is less than 54 mg/dL, treat with 30g carb, recheck glucose in 15 minutes.  If low again, repeat.         Contact information:   If you have concerns, please send me a Bubbly message or call the clinic at [phone].  For more urgent concerns, please call [phone] after hours/weekends and ask to speak with the endocrinologist on call.      Please let me know if you are having low blood sugars less than 70 or over 350 mg/dL.  Do not wait until your next appointment if this is happening.      Thank you for your partnership.  We look forward to getting to know you!          HEALTH PSYCHOLOGY    What is Health Psychology?  Health Psychology is a specialty that helps people cope with the stress and anxiety that often occurs with illness, emotional and psychological issues, and preparation for medical treatment including surgical procedures.    Telehealth and in-person services are now being provided to patients.    Location[facility]                       [address] [place] [address]                    Please arrive 15 minutes early to check in for in-person appointments.       We can help patients affected by  Adjustment problems  Anxiety  Cancer  Cardiovascular disorders  Chronic digestive disorders  Depression  Diabetes  Disability  Health-related behavior change  Insomnia  Other medical and nervous system conditions  People experiencing or wishing to prevent health problems  Pulmonary/lung conditions  Smoking  cessation  Transplants    Treatments we offer include  Psychological assessment  Psychotherapy/counseling  Cognitive behavioral therapy  Relaxation training  Behavior therapy  Motivational interviewing  Stress management    How We Help  Coping: We help people with the emotional issues related to their illness.  Challenges: Difficult challenges seem to increase during illness. We teach steps and strategies for managing stressful situations.  Feelings: We help people deal with anger, anxiety, confusion, depression, fear, frustration, grief, loss of control, sadness, uncertainty, and motivational issues.  Behaviors: When people are ill, it can be harder to take care of themselves. We help people manage weight, follow health regimens, relax, and quit smoking.  Counseling: We offer several types of counseling and can create a plan that meets needs of a patient. Our practice works with individuals, couples, and families.    Our Care Team  Working with primary and specialty physicians at [facility], we see patients in the hospital and clinic, allowing us to coordinate our services with the rest of people's medical needs.       To Schedule an Appointment  New patient appointments are generally scheduled through the Central Scheduling number: [phone].      Patients may contact our psychologists directly with questions or to make an appointment.    Yeni Rivas, Ph.D.,                   [phone] website  Jean Marie Chris, Ph.D., L.P.              [phone]  Mayra Peck, Ph.D.,                     [phone] website       Cristo Schofield, Ph.D.,     [phone] website   Deepa Ramirez, Ph.D.,                     [phone] website  Ysabel Stewart, Ph.D., A.B.P.P.,          [phone] website      NOTE: Services are confidential. Insurance coverage varies. Mental health benefits of health plans may have different levels of coverage than medical  benefits. Please confirm the coverage details with the insurance plan or our business office.

## 2025-05-21 NOTE — NURSING NOTE
"Chief Complaint   Patient presents with    Follow Up    Diabetes     TYPE2     Vital signs:      BP: 124/76 Pulse: 90           Height: 189.9 cm (6' 2.75\") Weight: 97.1 kg (214 lb)  Estimated body mass index is 26.93 kg/m  as calculated from the following:    Height as of this encounter: 1.899 m (6' 2.75\").    Weight as of this encounter: 97.1 kg (214 lb).        "

## 2025-06-10 ENCOUNTER — MYC REFILL (OUTPATIENT)
Dept: FAMILY MEDICINE | Facility: CLINIC | Age: 35
End: 2025-06-10
Payer: COMMERCIAL

## 2025-06-10 DIAGNOSIS — I10 BENIGN ESSENTIAL HYPERTENSION: ICD-10-CM

## 2025-06-10 RX ORDER — LISINOPRIL 20 MG/1
10 TABLET ORAL DAILY
Qty: 90 TABLET | Refills: 3 | Status: SHIPPED | OUTPATIENT
Start: 2025-06-10

## 2025-06-27 ENCOUNTER — VIRTUAL VISIT (OUTPATIENT)
Dept: EDUCATION SERVICES | Facility: CLINIC | Age: 35
End: 2025-06-27
Payer: COMMERCIAL

## 2025-06-27 DIAGNOSIS — Z79.4 TYPE 2 DIABETES MELLITUS WITH DIABETIC NEUROPATHY, WITH LONG-TERM CURRENT USE OF INSULIN (H): Primary | ICD-10-CM

## 2025-06-27 DIAGNOSIS — E11.40 TYPE 2 DIABETES MELLITUS WITH DIABETIC NEUROPATHY, WITH LONG-TERM CURRENT USE OF INSULIN (H): Primary | ICD-10-CM

## 2025-06-27 PROCEDURE — G0108 DIAB MANAGE TRN  PER INDIV: HCPCS | Mod: 95 | Performed by: NUTRITIONIST

## 2025-06-27 PROCEDURE — 1126F AMNT PAIN NOTED NONE PRSNT: CPT | Mod: 95 | Performed by: NUTRITIONIST

## 2025-06-27 NOTE — PROGRESS NOTES
Virtual Visit Details    Type of service:  Video Visit   Joined the call at 6/27/2025, 11:32:37 am.  Left the call at 6/27/2025, 12:09:23 pm.  You were on the call for 36 minutes 46 seconds.  Originating Location (pt. Location): Home    Distant Location (provider location):  Off-site  Platform used for Video Visit: Hotreader    Diabetes Self-Management Education & Support    Hieu Carson presents today for education related to Type 2 diabetes    He is accompanied by self    Year of diagnosis: summer of 2021  Referring provider:  Yumi Hooks  Now also seeing Becky Lux    PATIENT CONCERNS/REASON FOR REFERRAL   Newly sober. Working on getting better control of his glucose.       ASSESSMENT:    Taking Medication:     Current Diabetes Management per Patient:  Taking diabetes medications? yes:     Diabetes Medication(s)       Insulin       Insulin Glargine-yfgn (SEMGLEE, YFGN,) 100 UNIT/ML SOPN Inject 38 Units subcutaneously at bedtime.            Monitoring            Patient's most recent   Lab Results   Component Value Date    A1C 6.6 04/22/2025      Patient's A1C goal: <7.0    Activity: not assessed    Healthy Eating:   Patient is starting to gain more awareness of how different foods affect glucose.   CGM is helping with this by giving real time feedback.     Problem Solving:      Patient is at risk of hypoglycemia?: YES  Hospitalizations for hyper or hypoglycemia: Yes        EDUCATION and INSTRUCTION PROVIDED AT THIS VISIT:       Follow up with patient today. Reviewed dexcom report. Hieu is concerned about recently worsening glucose control. Patient states he relapsed (alcohol) on Memorial weekend and Father's day. Has not had a drink since then. But noticed worsening control starting this past Monday, which is also noted on the dexcom report. Patient states occasionally delaying lantus dose but rarely missing. He states he is not having symptoms of high glucose like he experienced in the past and that he  feels fine. Not feeling ill, no pain, no diet changes. He started a new sensor last night as well as a new insulin pen from a new box and those changes also did not result in any improvement to his glucose, so bad sensor or insulin are unlikely.    Consulted with Becky Lux and then follow up phone call was made to patient. Recommend increasing long acting insulin to 45 units (currently 38 units) once daily. He will follow up with Becky Lux on Tuesday (in four days) and also was instructed to follow up with his primary care physician for a full work up.     We discussed function, timing, injection technique of mealtime insulin today just in case that is started soon.     Number provided for Endo on call over the weekend and after hours.     Patient-stated goal written and given to Hieu Carson.  Verbalized and demonstrated understanding of instructions.     PLAN:  Increase insulin to 45 units.  Contact PCP for work up/assessment  Follow up with Becky Lux in four days    FOLLOW-UP:    Four days       Any diabetes medication initiation or dose changes were made via the [facility][note 3]ES Standing Orders per the patient's referring provider. A copy of this encounter was shared with the provider.      Subjective/Objective  Hieu is an 34 year old, presenting for the following diabetes education related to:    Diabetes education in the past 24mo: (Patient-Rptd) No  Diabetes type: (Patient-Rptd) Type 2  Disease course: (Patient-Rptd) Getting harder to manage  How confident are you filling out medical forms by yourself:: (Patient-Rptd) Quite a bit  Cultural Influences/Ethnic Background:  Not  or       Diabetes Symptoms & Complications:  Diabetes Related Symptoms: (Patient-Rptd) Fatigue, Polyuria (increased urination), Slow healing wounds  Weight trend: (Patient-Rptd) Stable  Symptom course: (Patient-Rptd) Stable  Disease course: (Patient-Rptd) Getting harder to manage       Patient Problem List and Family  "Medical History reviewed for relevant medical history, current medical status, and diabetes risk factors.    Vitals:  There were no vitals taken for this visit.  Estimated body mass index is 26.93 kg/m  as calculated from the following:    Height as of 5/21/25: 1.899 m (6' 2.75\").    Weight as of 5/21/25: 97.1 kg (214 lb).   Last 3 BP:   BP Readings from Last 3 Encounters:   05/21/25 124/76   04/22/25 102/68   04/25/24 (!) 152/78       History   Smoking Status    Every Day    Types: Cigarettes   Smokeless Tobacco    Never       Labs:  Lab Results   Component Value Date    A1C 6.6 04/22/2025     Lab Results   Component Value Date     04/22/2025     04/22/2025     08/29/2021     10/20/2011     Lab Results   Component Value Date     04/22/2025     Direct Measure HDL   Date Value Ref Range Status   04/22/2025 27 (L) >=40 mg/dL Final     GFR Estimate   Date Value Ref Range Status   04/22/2025 >90 >60 mL/min/1.73m2 Final     Comment:     eGFR calculated using 2021 CKD-EPI equation.   02/14/2020 >60 >60 mL/min/1.73m2 Final   10/20/2011 >90 >60 mL/min/1.7m2 Final     GFR Estimate If Black   Date Value Ref Range Status   02/14/2020 >60 >60 mL/min/1.73m2 Final   10/20/2011 >90 >60 mL/min/1.7m2 Final     Lab Results   Component Value Date    CR 0.59 04/22/2025    CR 0.80 10/20/2011     No results found for: \"MICROL\", \"UMALCR\", \"UCRR\"      Answers submitted by the patient for this visit:  Questionnaire about: Diabetes problem (Submitted on 5/1/2025)  Chief Complaint: Diabetes problem    "

## 2025-06-27 NOTE — NURSING NOTE
Current patient location: Patient declined to provide     Is the patient currently in the state of [place]? YES    Visit mode: VIDEO    If the visit is dropped, the patient can be reconnected by:VIDEO VISIT: Text to cell phone:   Telephone Information:   Mobile [phone]       Will anyone else be joining the visit? NO  (If patient encounters technical issues they should call [phone]    Are changes needed to the allergy or medication list? No    Are refills needed on medications prescribed by this physician? NO    Rooming Documentation:  Not applicable    Reason for visit: Diabetes Education    Lucia PLATT

## 2025-07-01 ENCOUNTER — OFFICE VISIT (OUTPATIENT)
Dept: ENDOCRINOLOGY | Facility: CLINIC | Age: 35
End: 2025-07-01
Payer: COMMERCIAL

## 2025-07-01 VITALS
BODY MASS INDEX: 26.93 KG/M2 | HEART RATE: 85 BPM | DIASTOLIC BLOOD PRESSURE: 72 MMHG | WEIGHT: 214 LBS | SYSTOLIC BLOOD PRESSURE: 120 MMHG

## 2025-07-01 DIAGNOSIS — E11.40 TYPE 2 DIABETES MELLITUS WITH DIABETIC NEUROPATHY, WITH LONG-TERM CURRENT USE OF INSULIN (H): Primary | ICD-10-CM

## 2025-07-01 DIAGNOSIS — Z79.4 TYPE 2 DIABETES MELLITUS WITH OTHER SPECIFIED COMPLICATION, WITH LONG-TERM CURRENT USE OF INSULIN (H): ICD-10-CM

## 2025-07-01 DIAGNOSIS — E11.69 TYPE 2 DIABETES MELLITUS WITH OTHER SPECIFIED COMPLICATION, WITH LONG-TERM CURRENT USE OF INSULIN (H): ICD-10-CM

## 2025-07-01 DIAGNOSIS — I10 BENIGN ESSENTIAL HYPERTENSION: ICD-10-CM

## 2025-07-01 DIAGNOSIS — Z79.4 TYPE 2 DIABETES MELLITUS WITH DIABETIC NEUROPATHY, WITH LONG-TERM CURRENT USE OF INSULIN (H): Primary | ICD-10-CM

## 2025-07-01 DIAGNOSIS — K70.31 ALCOHOLIC CIRRHOSIS OF LIVER WITH ASCITES (H): ICD-10-CM

## 2025-07-01 DIAGNOSIS — F33.41 RECURRENT MAJOR DEPRESSIVE DISORDER, IN PARTIAL REMISSION: ICD-10-CM

## 2025-07-01 DIAGNOSIS — R73.9 HYPERGLYCEMIA: ICD-10-CM

## 2025-07-01 RX ORDER — INSULIN GLARGINE-YFGN 100 [IU]/ML
50 INJECTION, SOLUTION SUBCUTANEOUS AT BEDTIME
Qty: 30 ML | Refills: 11 | Status: SHIPPED | OUTPATIENT
Start: 2025-07-01

## 2025-07-01 NOTE — PATIENT INSTRUCTIONS
Increase Semglee to 50 units once daily.    START Novolog 4 units with small meals, 6 units with medium meals, and 8 units with large meals. Take Novolog 10-15 minutes prior to eating. If not eating, do not take Novolog.    ----------------------------------------------------    Emergency issues: Here are some concerns you should contact us about.  -Vomiting: more than twice.  Please check ketones.  If positive, go to ER. Monitor glucose hourly.   -High glucose (over 300 mg/dL twice in a row): Please check ketones.  If ketones are negative, take an insulin correction and recheck glucose in 1 hour.  If glucose is not coming down, please call the clinic. If ketones are moderate or large, drink lots of water, take an insulin correction 1.5 times your usual correction, and recheck ketones in 1 hour.  If ketones are still present (or you are vomiting), go to the ER.    -Hypoglycemia (low glucose):   If glucose is less than 70 mg/dL, treat with 15g carb (4 glucose tablets), recheck glucose in 15 minutes.  If low again, repeat.   If glucose is less than 54 mg/dL, treat with 30g carb, recheck glucose in 15 minutes.  If low again, repeat.      Contact information:   If you have concerns, please send me a [employer] message or call the clinic at [phone].  For more urgent concerns, please call [phone] after hours/weekends and ask to speak with the endocrinologist on call.      Please let me know if you are having low blood sugars less than 70 or over 350 mg/dL.  Do not wait until your next appointment if this is happening.

## 2025-07-01 NOTE — PROGRESS NOTES
Diabetes Consult Note  July 1, 2025        Hieu Carson  is a 34 year old male with Type 2 Diabetes here to establish care. He also has a past medical history of cirrhosis, HTN, alcohol dependence with withdrawal, opiate dependence on suboxone, polysubstance abuse, Depressive disorder, Opioid abuse (H), Tobacco abuse, and Urethral stricture.           HPI:  Here for follow up of Diabetes, last seen 5/21/2025. Sooner follow up scheduled due to recent hyperglycemia.    Type 2 Diabetes was diagnosed summer 2021. Had significant weight loss and checked blood glucose with grandmother's glucometer and was 401 mg/dL, went to ER and given insulin and discharged. Took metformin for 1 week, then treatment was changed to insulin. Has been on basal insulin only since insulin therapy initiated.     No history of pancreatitis found in chart review, however patient thinks he may have a positive history of mild pancreatitis.   C-Peptide 4.2 on 4/22/2025 with glucose 163.  LONG negative 4/22/2025.    Family members with diabetes:  Maternal Grandmother with Type 2 Diabetes, Paternal Grandmother Type 2 Diabetes. More distant relatives on mom's side. Paternal uncle with Type 2.    He recently underwent inpatient detox for alcohol abuse at [facility].  Back at work since May 7th approx. 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 Long commute.    Living with parents currently, good support, good relationship.     Hx +alcoholic cirrhosis with ascites. Gastro appointment at [facility] in July.    Hx bulbous urethral stricture, still with occasional stinging with urination.     At last visit in May 2025 we made the plan to continue Semglee 38 units once daily at bedtime and deferred addition of bolus insulin in favor of simplified regimen of basal insulin only, with consideration to new sobriety, newly back to work, and limited CGM data supporting fairly well controlled blood glucose.    In  "the interim, patient has seen Diabetes Education Kalli Beauchamp RD 6/27/2025 who noted hyperglycemia starting essentially overnight June 23 and continuing since then. Patient denied any missed medication doses. He does endorse 2 relapse episodes with alcohol lasting 1 weekend each in the past 1 month and felt \"really sick\" with those, with alcohol withdrawal symptoms of tremor/anxiety after. Kalli Beauchamp RD, called me to discuss hyperglycemia and we made the plan that I would see pt for sooner follow up, Hieu would call PCP office, and increase Lantus to 45 units once daily.     Today Hieu reports experiencing fatigue, some increased urination, some achy joints which he has had when blood glucose running high previously. He increased Semglee insulin as directed 7/27 to 45 units once daily, has still been running high, no lows. Patient also reports that he has lost his job and is on COBRA insurance this month, next month plans to use Mnsure probably. He is staying at his cabin in [place], [place]. Reports a little more snacking with his family friends visiting but no big dietary changes.    Current Diabetes Medications:  Semglee 45 units once daily at bedtime.       We reviewed glucometer/CGMS data together.  It revealed:  Increase in blood glucose from 6/23 to 6/24 and has remained elevated since 6/24. No lows. Average blood glucose has increased to 245 mg/dL.                History of DKA:  Not found in chart but patient reports he was ketotic on diagnosis for DM2.     History of hospitalizations for diabetes:  Never    Ability to sense low blood sugars:  Intact, starts having symptoms at 100.     History of Diabetes monitoring and complications/ prevention:  CAD: no  Last eye exam results: May 2025 month ago at [facility] per pt, with Tatyana Jones and Alejandro, no DR.  Last dental exam: Not discussed  Neuropathy: + neuropathy in toes with numbness, abnormal monofilament June 2025.  Nephropathy: No, on " lisinopril.  HTN: BP at goal.   On statin: Recently stopped by PCP.  Depression: Recently sober, not currently following with therapy, good support at home per patient.  Feet: No ulcers or lesions per patient.    Hieu's PMH, PSH and allergies were reviewed today and pertinent information is summarized above.    Hieu's pertinent social and family history are also reviewed today and pertinent information is summarized above.    Current Outpatient Medications   Medication Sig Dispense Refill    acamprosate (CAMPRAL) 333 MG EC tablet Take 666 mg by mouth.      blood glucose (NO BRAND SPECIFIED) lancets standard Use to test blood sugar 2  times daily or as directed. 100 each 1    blood glucose (NO BRAND SPECIFIED) test strip Use to test blood sugar 2 times daily or as directed. 200 strip 1    blood glucose monitoring (ACCU-CHEK FE PLUS) meter device kit Use to test blood sugar 2 times daily or as directed. 1 kit 0    blood glucose monitoring (SOFTCLIX) lancets USE TO TEST BLOOD SUGAR TWO TIMES DAILY OR AS DIRECTED.      buprenorphine HCl-naloxone HCl (SUBOXONE) 8-2 MG per film Place 1 Film under the tongue 2 times daily      Continuous Glucose Sensor (DEXCOM G7 SENSOR) MISC 1 each every 10 days. Change sensor every 10 days 9 each 5    Insulin Glargine-yfgn (SEMGLEE, YFGN,) 100 UNIT/ML SOPN Inject 38 Units subcutaneously at bedtime. 15 mL 1    insulin pen needle (32G X 4 MM) 32G X 4 MM miscellaneous Use insulin pen needles daily or as directed. 100 each 1    lactulose (CHRONULAC) 10 GM/15ML solution Take 20 g by mouth.      lisinopril (ZESTRIL) 20 MG tablet Take 0.5 tablets (10 mg) by mouth daily. NEED LABS for further refills 90 tablet 3    melatonin 3 MG tablet       sertraline (ZOLOFT) 100 MG tablet Take 100 mg by mouth daily       No current facility-administered medications for this visit.         ROS:   Reports good physical activity tolerance.  Denies any pedal lesions or vision changes or concerns. Denies any  "other acute concerns except as noted above.      Exam:    There were no vitals taken for this visit.  Wt Readings from Last 10 Encounters:   05/21/25 97.1 kg (214 lb)   04/22/25 95.3 kg (210 lb)   04/25/24 105.5 kg (232 lb 9.6 oz)   10/10/23 103.9 kg (229 lb)   03/14/23 106.5 kg (234 lb 12.8 oz)   09/03/21 80.2 kg (176 lb 12.8 oz)   08/29/21 76.9 kg (169 lb 8 oz)   08/29/21 76.8 kg (169 lb 5 oz)   08/19/16 96.7 kg (213 lb 3.2 oz)     Estimated body mass index is 26.93 kg/m  as calculated from the following:    Height as of 5/21/25: 1.899 m (6' 2.75\").    Weight as of 5/21/25: 97.1 kg (214 lb).    Physical Exam:  General: Pleasant, well nourished and hydrated male in [facility].   Psych:  Mood is \"good,\" affect is appropriate.  Thought form and content are fluid and coherent.    HEENT: Eyes and sclera are clear. Extraocular movements are grossly intact without proptosis.    Neck: No masses or JVD are noted.    Resp: Easy and unlabored breathing. Lungs CTA  Cardiovascular: RRR  Neuro: Alert and oriented, communicating clearly.   Ext: no swelling or edema.          Data:      Recent Labs   Lab Test 04/22/25  1218 04/25/24  1001 12/11/23  1200 10/10/23  1023 03/14/23  1602   A1C 6.6* 7.9*  --  6.5* 6.3*   TSH  --   --   --   --  1.54   * 103*  --   --  120*   HDL 27* 30*  --   --  41   TRIG 109 150*  --   --  131   CR 0.59* 0.47* 0.50*  --  0.57*   AST  --   --  112* 134* 123*   ALT  --   --  94* 109* 136*     Lab Results   Component Value Date    CPEPT 4.2 04/22/2025    GADAB <5.0 04/22/2025     Cholesterol   Date Value Ref Range Status   04/22/2025 156 <200 mg/dL Final   04/25/2024 163 <200 mg/dL Final       Hemoglobin   Date Value Ref Range Status   12/11/2023 14.8 13.3 - 17.7 g/dL Final   10/20/2011 14.5 13.3 - 17.7 g/dL Final   ]      Most recent GFRs:  GFR Estimate   Date Value Ref Range Status   04/22/2025 >90 >60 mL/min/1.73m2 Final     Comment:     eGFR calculated using 2021 CKD-EPI equation.   04/25/2024 >90 " >60 mL/min/1.73m2 Final   12/11/2023 >90 >60 mL/min/1.73m2 Final   02/14/2020 >60 >60 mL/min/1.73m2 Final   10/20/2011 >90 >60 mL/min/1.7m2 Final   06/12/2011 >90 >60 mL/min/1.7m2 Final   03/27/2011 >90 >60 mL/min/1.7m2 Final       Lab Results   Component Value Date    CPEPT 4.2 04/22/2025    GADAB <5.0 04/22/2025     AST   Date Value Ref Range Status   12/11/2023 112 (H) 0 - 45 U/L Final     Comment:     Reference intervals for this test were updated on 6/12/2023 to more accurately reflect our healthy population. There may be differences in the flagging of prior results with similar values performed with this method. Interpretation of those prior results can be made in the context of the updated reference intervals.   10/20/2011 20 0 - 55 U/L Final     Lab Results   Component Value Date    ALT 94 12/11/2023    ALT 22 10/20/2011             Assessment/Plan:    Type 2 Diabetes with peripheral neuropathy, with long-term use of insulin.  Blood glucose control: Not at goal with significant increase in blood glucose starting June 24th with elevation since then. Today we made the following plan:  Increase Lantus to 50 units once daily. START Novolog with meals 4 units small meal, 6 units medium meal, 8 units large meal.     Counseled to call PCP office today to workup if there is another medical cause for hyperglycemia.   Ketone strips ordered and reviewed indications for use.  [facility] Pharmacy referral ordered to address medication affordability with insurance changes.    Will check C-Peptide with glucose and CMP.     1 week follow up by Video Visit.    DM Complications-   Retinopathy:  No. Up to date with eye exam  Nephropathy:  BP at goal today. Due to check microalbuminuria.  Creatinine stable.   Neuropathy: Yes. Abnormal monofilament last visit.  Feet: OK, no ulcers or lesions.   Lipids: Per patient atorvastatin discontinued by PCP last month.    2. Alcoholic cirrhosis of liver with ascites  Appointment with hepatology  GI in July.    3. Depression  Per patient has good support at home, declines referral to Health Psychology today. He will consider for the future, information provided in AVS.    4. Hypertension  BP at goal today. Managed by PCP.        Follow up 1 week video visit.      54 minutes spent on the date of the encounter doing chart review, history and exam, documentation, education and counseling, as well as communication and coordination of care, and further activities as noted above.  This time excludes time spent reviewing CGM.    The longitudinal plan of care for the diagnosis(es)/condition(s) as documented were addressed during this visit. Due to the added complexity in care, I will continue to support Hieu in the subsequent management and with ongoing continuity of care.    It is my privilege to be involved in the care of the above patient.     MARCUS Gonzalez, CNP

## 2025-07-01 NOTE — LETTER
7/1/2025      Hieu Carson  [address] [id number]      Dear Colleague,    Thank you for referring your patient, Hieu Carson, to the [facility]. Please see a copy of my visit note below.             Diabetes Consult Note  July 1, 2025        Hieu Carson  is a 34 year old male with Type 2 Diabetes here to establish care. He also has a past medical history of cirrhosis, HTN, alcohol dependence with withdrawal, opiate dependence on suboxone, polysubstance abuse, Depressive disorder, Opioid abuse (H), Tobacco abuse, and Urethral stricture.           HPI:  Here for follow up of Diabetes, last seen 5/21/2025. Sooner follow up scheduled due to recent hyperglycemia.    Type 2 Diabetes was diagnosed summer 2021. Had significant weight loss and checked blood glucose with grandmother's glucometer and was 401 mg/dL, went to ER and given insulin and discharged. Took metformin for 1 week, then treatment was changed to insulin. Has been on basal insulin only since insulin therapy initiated.     No history of pancreatitis found in chart review, however patient thinks he may have a positive history of mild pancreatitis.   C-Peptide 4.2 on 4/22/2025 with glucose 163.  LONG negative 4/22/2025.    Family members with diabetes:  Maternal Grandmother with Type 2 Diabetes, Paternal Grandmother Type 2 Diabetes. More distant relatives on mom's side. Paternal uncle with Type 2.    He recently underwent inpatient detox for alcohol abuse at [facility].  Back at work since May 7th approx. 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 Long commute.    Living with parents currently, good support, good relationship.     Hx +alcoholic cirrhosis with ascites. Gastro appointment at [facility] in July.    Hx bulbous urethral stricture, still with occasional stinging with urination.     At last visit in May 2025 we made the plan to continue Semglee 38 units once daily at  "bedtime and deferred addition of bolus insulin in favor of simplified regimen of basal insulin only, with consideration to new sobriety, newly back to work, and limited CGM data supporting fairly well controlled blood glucose.    In the interim, patient has seen Diabetes Education Kalli Beauchamp RD 6/27/2025 who noted hyperglycemia starting essentially overnight June 23 and continuing since then. Patient denied any missed medication doses. He does endorse 2 relapse episodes with alcohol lasting 1 weekend each in the past 1 month and felt \"really sick\" with those, with alcohol withdrawal symptoms of tremor/anxiety after. Kalli Beauchamp RD, called me to discuss hyperglycemia and we made the plan that I would see pt for sooner follow up, Hieu would call PCP office, and increase Lantus to 45 units once daily.     Today Hieu reports experiencing fatigue, some increased urination, some achy joints which he has had when blood glucose running high previously. He increased Semglee insulin as directed 7/27 to 45 units once daily, has still been running high, no lows. Patient also reports that he has lost his job and is on COBRA insurance this month, next month plans to use Mnsure probably. He is staying at his cabin in [place], [place]. Reports a little more snacking with his family friends visiting but no big dietary changes.    Current Diabetes Medications:  Semglee 45 units once daily at bedtime.       We reviewed glucometer/CGMS data together.  It revealed:  Increase in blood glucose from 6/23 to 6/24 and has remained elevated since 6/24. No lows. Average blood glucose has increased to 245 mg/dL.                History of DKA:  Not found in chart but patient reports he was ketotic on diagnosis for DM2.     History of hospitalizations for diabetes:  Never    Ability to sense low blood sugars:  Intact, starts having symptoms at 100.     History of Diabetes monitoring and complications/ prevention:  CAD: no  Last eye exam " results: [email or web address], with Tatyana Jones and Alejandro, no DR.  Last dental exam: Not discussed  Neuropathy: + neuropathy in toes with numbness, abnormal monofilament June 2025.  Nephropathy: No, on lisinopril.  HTN: BP at goal.   On statin: Recently stopped by PCP.  Depression: Recently sober, not currently following with therapy, good support at home per patient.  Feet: No ulcers or lesions per patient.    Hieu's PMH, PSH and allergies were reviewed today and pertinent information is summarized above.    Hieu's pertinent social and family history are also reviewed today and pertinent information is summarized above.    Current Outpatient Medications   Medication Sig Dispense Refill     acamprosate (CAMPRAL) 333 MG EC tablet Take 666 mg by mouth.       blood glucose (NO BRAND SPECIFIED) lancets standard Use to test blood sugar 2  times daily or as directed. 100 each 1     blood glucose (NO BRAND SPECIFIED) test strip Use to test blood sugar 2 times daily or as directed. 200 strip 1     blood glucose monitoring (ACCU-CHEK FE PLUS) meter device kit Use to test blood sugar 2 times daily or as directed. 1 kit 0     blood glucose monitoring (SOFTCLIX) lancets USE TO TEST BLOOD SUGAR TWO TIMES DAILY OR AS DIRECTED.       buprenorphine HCl-naloxone HCl (SUBOXONE) 8-2 MG per film Place 1 Film under the tongue 2 times daily       Continuous Glucose Sensor (DEXCOM G7 SENSOR) MISC 1 each every 10 days. Change sensor every 10 days 9 each 5     Insulin Glargine-yfgn (SEMGLEE, YFGN,) 100 UNIT/ML SOPN Inject 38 Units subcutaneously at bedtime. 15 mL 1     insulin pen needle (32G X 4 MM) 32G X 4 MM miscellaneous Use insulin pen needles daily or as directed. 100 each 1     lactulose (CHRONULAC) 10 GM/15ML solution Take 20 g by mouth.       lisinopril (ZESTRIL) 20 MG tablet Take 0.5 tablets (10 mg) by mouth daily. NEED LABS for further refills 90 tablet 3     melatonin 3 MG tablet         "sertraline (ZOLOFT) 100 MG tablet Take 100 mg by mouth daily       No current facility-administered medications for this visit.         ROS:   Reports good physical activity tolerance.  Denies any pedal lesions or vision changes or concerns. Denies any other acute concerns except as noted above.      Exam:    There were no vitals taken for this visit.  Wt Readings from Last 10 Encounters:   05/21/25 97.1 kg (214 lb)   04/22/25 95.3 kg (210 lb)   04/25/24 105.5 kg (232 lb 9.6 oz)   10/10/23 103.9 kg (229 lb)   03/14/23 106.5 kg (234 lb 12.8 oz)   09/03/21 80.2 kg (176 lb 12.8 oz)   08/29/21 76.9 kg (169 lb 8 oz)   08/29/21 76.8 kg (169 lb 5 oz)   08/19/16 96.7 kg (213 lb 3.2 oz)     Estimated body mass index is 26.93 kg/m  as calculated from the following:    Height as of 5/21/25: 1.899 m (6' 2.75\").    Weight as of 5/21/25: 97.1 kg (214 lb).    Physical Exam:  General: Pleasant, well nourished and hydrated male in [facility].   Psych:  Mood is \"good,\" affect is appropriate.  Thought form and content are fluid and coherent.    HEENT: Eyes and sclera are clear. Extraocular movements are grossly intact without proptosis.    Neck: No masses or JVD are noted.    Resp: Easy and unlabored breathing. Lungs CTA  Cardiovascular: RRR  Neuro: Alert and oriented, communicating clearly.   Ext: no swelling or edema.          Data:      Recent Labs   Lab Test 04/22/25  1218 04/25/24  1001 12/11/23  1200 10/10/23  1023 03/14/23  1602   A1C 6.6* 7.9*  --  6.5* 6.3*   TSH  --   --   --   --  1.54   * 103*  --   --  120*   HDL 27* 30*  --   --  41   TRIG 109 150*  --   --  131   CR 0.59* 0.47* 0.50*  --  0.57*   AST  --   --  112* 134* 123*   ALT  --   --  94* 109* 136*     Lab Results   Component Value Date    CPEPT 4.2 04/22/2025    GADAB <5.0 04/22/2025     Cholesterol   Date Value Ref Range Status   04/22/2025 156 <200 mg/dL Final   04/25/2024 163 <200 mg/dL Final       Hemoglobin   Date Value Ref Range Status   12/11/2023 14.8 " 13.3 - 17.7 g/dL Final   10/20/2011 14.5 13.3 - 17.7 g/dL Final   ]      Most recent GFRs:  GFR Estimate   Date Value Ref Range Status   04/22/2025 >90 >60 mL/min/1.73m2 Final     Comment:     eGFR calculated using 2021 CKD-EPI equation.   04/25/2024 >90 >60 mL/min/1.73m2 Final   12/11/2023 >90 >60 mL/min/1.73m2 Final   02/14/2020 >60 >60 mL/min/1.73m2 Final   10/20/2011 >90 >60 mL/min/1.7m2 Final   06/12/2011 >90 >60 mL/min/1.7m2 Final   03/27/2011 >90 >60 mL/min/1.7m2 Final       Lab Results   Component Value Date    CPEPT 4.2 04/22/2025    GADAB <5.0 04/22/2025     AST   Date Value Ref Range Status   12/11/2023 112 (H) 0 - 45 U/L Final     Comment:     Reference intervals for this test were updated on 6/12/2023 to more accurately reflect our healthy population. There may be differences in the flagging of prior results with similar values performed with this method. Interpretation of those prior results can be made in the context of the updated reference intervals.   10/20/2011 20 0 - 55 U/L Final     Lab Results   Component Value Date    ALT 94 12/11/2023    ALT 22 10/20/2011             Assessment/Plan:    Type 2 Diabetes with peripheral neuropathy, with long-term use of insulin.  Blood glucose control: Not at goal with significant increase in blood glucose starting June 24th with elevation since then. Today we made the following plan:  Increase Lantus to 50 units once daily. START Novolog with meals 4 units small meal, 6 units medium meal, 8 units large meal.     Counseled to call PCP office today to workup if there is another medical cause for hyperglycemia.   Ketone strips ordered and reviewed indications for use.  [facility] referral ordered to address medication affordability with insurance changes.    Will check C-Peptide with glucose and CMP.     1 week follow up by Video Visit.    DM Complications-   Retinopathy:  No. Up to date with eye exam  Nephropathy:  BP at goal today. Due to check  microalbuminuria.  Creatinine stable.   Neuropathy: Yes. Abnormal monofilament last visit.  Feet: OK, no ulcers or lesions.   Lipids: Per patient atorvastatin discontinued by PCP last month.    2. Alcoholic cirrhosis of liver with ascites  Appointment with hepatology GI in July.    3. Depression  Per patient has good support at home, declines referral to Health Psychology today. He will consider for the future, information provided in AVS.    4. Hypertension  BP at goal today. Managed by PCP.        Follow up 1 week video visit.      54 minutes spent on the date of the encounter doing chart review, history and exam, documentation, education and counseling, as well as communication and coordination of care, and further activities as noted above.  This time excludes time spent reviewing CGM.    The longitudinal plan of care for the diagnosis(es)/condition(s) as documented were addressed during this visit. Due to the added complexity in care, I will continue to support Hieu in the subsequent management and with ongoing continuity of care.    It is my privilege to be involved in the care of the above patient.     MARCUS Gonzalez, CNP              Again, thank you for allowing me to participate in the care of your patient.        Sincerely,        RICHARD Gonzalez CNP    Electronically signed

## 2025-07-02 ENCOUNTER — MYC MEDICAL ADVICE (OUTPATIENT)
Dept: ENDOCRINOLOGY | Facility: CLINIC | Age: 35
End: 2025-07-02
Payer: COMMERCIAL

## 2025-07-02 ENCOUNTER — RESULTS FOLLOW-UP (OUTPATIENT)
Dept: ENDOCRINOLOGY | Facility: CLINIC | Age: 35
End: 2025-07-02
Payer: COMMERCIAL

## 2025-07-02 LAB
ALBUMIN SERPL BCG-MCNC: 4.5 G/DL (ref 3.5–5.2)
ALP SERPL-CCNC: 187 U/L (ref 40–150)
ALT SERPL W P-5'-P-CCNC: 44 U/L (ref 0–70)
ANION GAP SERPL CALCULATED.3IONS-SCNC: 11 MMOL/L (ref 7–15)
AST SERPL W P-5'-P-CCNC: 31 U/L (ref 0–45)
BILIRUB SERPL-MCNC: 1.1 MG/DL
BUN SERPL-MCNC: 14.4 MG/DL (ref 6–20)
C PEPTIDE SERPL-MCNC: 6.8 NG/ML (ref 0.9–6.9)
CALCIUM SERPL-MCNC: 9.7 MG/DL (ref 8.8–10.4)
CHLORIDE SERPL-SCNC: 96 MMOL/L (ref 98–107)
CREAT SERPL-MCNC: 0.49 MG/DL (ref 0.67–1.17)
CREAT UR-MCNC: 46.5 MG/DL
EGFRCR SERPLBLD CKD-EPI 2021: >90 ML/MIN/1.73M2
FASTING STATUS PATIENT QL REPORTED: NO
GLUCOSE SERPL-MCNC: 354 MG/DL (ref 70–99)
GLUCOSE SERPL-MCNC: 354 MG/DL (ref 70–99)
HCO3 SERPL-SCNC: 25 MMOL/L (ref 22–29)
MICROALBUMIN UR-MCNC: <12 MG/L
MICROALBUMIN/CREAT UR: NORMAL MG/G{CREAT}
POTASSIUM SERPL-SCNC: 4.6 MMOL/L (ref 3.4–5.3)
PROT SERPL-MCNC: 7.4 G/DL (ref 6.4–8.3)
SODIUM SERPL-SCNC: 132 MMOL/L (ref 135–145)

## 2025-07-02 RX ORDER — INSULIN LISPRO 100 [IU]/ML
INJECTION, SOLUTION INTRAVENOUS; SUBCUTANEOUS
Qty: 15 ML | Refills: 11 | Status: CANCELLED | OUTPATIENT
Start: 2025-07-02

## 2025-07-02 NOTE — TELEPHONE ENCOUNTER
Called pt and he confirmed getting a pen. Reminded him to check with a PMD about BG remaining high. Suggested an internal medicine provider.    AJAY Frye RN 7/2/2025 2:43 PM

## 2025-07-02 NOTE — TELEPHONE ENCOUNTER
"Called pharmacy to discuss pt message.  \"Drug not covered\", but did not show any alternative. System shows that brand name should be covered on our end. Called back and they have one pen they can given him that is brand name and covered.    AJAY Frye RN 7/2/2025 2:20 PM    "

## 2025-07-18 ENCOUNTER — VIRTUAL VISIT (OUTPATIENT)
Dept: EDUCATION SERVICES | Facility: CLINIC | Age: 35
End: 2025-07-18
Payer: COMMERCIAL

## 2025-07-18 DIAGNOSIS — E11.40 TYPE 2 DIABETES MELLITUS WITH DIABETIC NEUROPATHY, WITH LONG-TERM CURRENT USE OF INSULIN (H): Primary | ICD-10-CM

## 2025-07-18 DIAGNOSIS — Z79.4 TYPE 2 DIABETES MELLITUS WITH DIABETIC NEUROPATHY, WITH LONG-TERM CURRENT USE OF INSULIN (H): Primary | ICD-10-CM

## 2025-07-18 PROCEDURE — 1126F AMNT PAIN NOTED NONE PRSNT: CPT | Mod: 95 | Performed by: NUTRITIONIST

## 2025-07-18 PROCEDURE — 99207 PR NO BILLABLE SERVICE THIS VISIT: CPT | Mod: 95 | Performed by: NUTRITIONIST

## 2025-07-18 RX ORDER — ACYCLOVIR 400 MG/1
TABLET ORAL
COMMUNITY
Start: 2025-04-22

## 2025-07-18 NOTE — NURSING NOTE
Current patient location: Patient declined to provide     Is the patient currently in the state of [place]? YES    Visit mode: VIDEO    If the visit is dropped, the patient can be reconnected by:VIDEO VISIT: Text to cell phone:   Telephone Information:   Mobile [phone]       Will anyone else be joining the visit? NO  (If patient encounters technical issues they should call [phone]    Are changes needed to the allergy or medication list? No    Are refills needed on medications prescribed by this physician? NO    Rooming Documentation:  Not applicable    Reason for visit: Diabetes Education    Lucia PLATT

## 2025-07-18 NOTE — PROGRESS NOTES
Virtual Visit Details    Type of service:  Video Visit   Start time: 7/18/2025  1:46 PM CDT End time: 7/18/2025  2:26 PM CDT  Start 2pm   End 2:26 pm  Total time 26 minutes  Originating Location (pt. Location): Home    Distant Location (provider location):  Off-site  Platform used for Video Visit: Carol    Diabetes Self-Management Education & Support    Hieu Carson presents today for education related to Type 2 diabetes    He is accompanied by self    Year of diagnosis: summer of 2021  Referring provider:  Yumi Hooks  Now also seeing Becky Lux    PATIENT CONCERNS/REASON FOR REFERRAL   Newly sober. Working on getting better control of his glucose.   Has had some relapses and is consider in patient treatment which would start on sunday      ASSESSMENT:    Taking Medication:     Current Diabetes Management per Patient:  Taking diabetes medications? yes:     Diabetes Medication(s)       Insulin       insulin aspart (NOVOLOG PEN) 100 UNIT/ML pen Inject 4-8 units three times daily with meals, max dose 24 units daily.     Insulin Glargine-yfgn (SEMGLEE, YFGN,) 100 UNIT/ML SOPN Inject 50 Units subcutaneously at bedtime.            Monitoring                            Patient's most recent   Lab Results   Component Value Date    A1C 6.6 04/22/2025      Patient's A1C goal: <7.0    Activity: not assessed    Healthy Eating:   Patient is starting to gain more awareness of how different foods affect glucose.   CGM is helping with this by giving real time feedback.     Problem Solving:      Patient is at risk of hypoglycemia?: YES  Hospitalizations for hyper or hypoglycemia: Yes        EDUCATION and INSTRUCTION PROVIDED AT THIS VISIT:      Follow up with patient today. He is now taking meal time insulin since last appointment with Becky Lux. He is taking semglee 50 units and novolog 10 units with each meal. Recommend to increase rapid acting insulin given post prandial highs.     Patient-stated goal written  "and given to Hieu Carson.  Verbalized and demonstrated understanding of instructions.     PLAN:  Increase mealtime insulin to 12 units per meal    FOLLOW-UP:    Next follow up with Becky Lux      Any diabetes medication initiation or dose changes were made via the [facility] Standing Orders per the patient's referring provider. A copy of this encounter was shared with the provider.      Subjective/Objective  Hieu is an 34 year old, presenting for the following diabetes education related to:    Diabetes education in the past 24mo: (Patient-Rptd) No  Diabetes type: (Patient-Rptd) Type 2  Disease course: (Patient-Rptd) Getting harder to manage  How confident are you filling out medical forms by yourself:: (Patient-Rptd) Quite a bit  Cultural Influences/Ethnic Background:  Not  or       Diabetes Symptoms & Complications:  Diabetes Related Symptoms: (Patient-Rptd) Fatigue, Polyuria (increased urination), Slow healing wounds  Weight trend: (Patient-Rptd) Stable  Symptom course: (Patient-Rptd) Stable  Disease course: (Patient-Rptd) Getting harder to manage       Patient Problem List and Family Medical History reviewed for relevant medical history, current medical status, and diabetes risk factors.    Vitals:  There were no vitals taken for this visit.  Estimated body mass index is 26.93 kg/m  as calculated from the following:    Height as of 5/21/25: 1.899 m (6' 2.75\").    Weight as of 7/1/25: 97.1 kg (214 lb).   Last 3 BP:   BP Readings from Last 3 Encounters:   07/01/25 120/72   05/21/25 124/76   04/22/25 102/68       History   Smoking Status    Every Day    Types: Cigarettes   Smokeless Tobacco    Never       Labs:  Lab Results   Component Value Date    A1C 6.6 04/22/2025     Lab Results   Component Value Date     04/22/2025     04/22/2025     08/29/2021     10/20/2011     Lab Results   Component Value Date     04/22/2025     Direct Measure HDL   Date Value Ref Range " Status   04/22/2025 27 (L) >=40 mg/dL Final     GFR Estimate   Date Value Ref Range Status   07/01/2025 >90 >60 mL/min/1.73m2 Final     Comment:     eGFR calculated using 2021 CKD-EPI equation.   02/14/2020 >60 >60 mL/min/1.73m2 Final   10/20/2011 >90 >60 mL/min/1.7m2 Final     GFR Estimate If Black   Date Value Ref Range Status   02/14/2020 >60 >60 mL/min/1.73m2 Final   10/20/2011 >90 >60 mL/min/1.7m2 Final     Lab Results   Component Value Date    CR 0.59 04/22/2025    CR 0.80 10/20/2011     Lab Results   Component Value Date    MICROL <12.0 07/01/2025    UMALCR  07/01/2025      Comment:      Unable to calculate, urine albumin and/or urine creatinine is outside detectable limits.  Microalbuminuria is defined as an albumin:creatinine ratio of 17 to 299 for males and 25 to 299 for females. A ratio of albumin:creatinine of 300 or higher is indicative of overt proteinuria.  Due to biologic variability, positive results should be confirmed by a second, first-morning random or 24-hour timed urine specimen. If there is discrepancy, a third specimen is recommended. When 2 out of 3 results are in the microalbuminuria range, this is evidence for incipient nephropathy and warrants increased efforts at glucose control, blood pressure control, and institution of therapy with an angiotensin-converting-enzyme (ACE) inhibitor (if the patient can tolerate it).      RR 46.5 07/01/2025         Answers submitted by the patient for this visit:  Questionnaire about: Diabetes problem (Submitted on 5/1/2025)  Chief Complaint: Diabetes problem

## 2025-07-24 ENCOUNTER — TRANSFERRED RECORDS (OUTPATIENT)
Dept: MULTI SPECIALTY CLINIC | Facility: CLINIC | Age: 35
End: 2025-07-24
Payer: COMMERCIAL

## 2025-07-24 LAB
ALT SERPL-CCNC: 27 IU/L (ref 0–44)
AST SERPL-CCNC: 27 IU/L (ref 0–40)
CREATININE (EXTERNAL): 0.57 MG/DL (ref 0.76–1.27)
GFR ESTIMATED (EXTERNAL): 130 ML/MIN/1.73
GLUCOSE (EXTERNAL): 149 MG/DL (ref 70–99)
INR (EXTERNAL): 1.1 (ref 0.9–1.2)
POTASSIUM (EXTERNAL): 4.5 MMOL/L (ref 3.5–5.2)

## 2025-07-29 ENCOUNTER — VIRTUAL VISIT (OUTPATIENT)
Dept: ENDOCRINOLOGY | Facility: CLINIC | Age: 35
End: 2025-07-29
Payer: COMMERCIAL

## 2025-07-29 DIAGNOSIS — I10 BENIGN ESSENTIAL HYPERTENSION: ICD-10-CM

## 2025-07-29 DIAGNOSIS — Z79.4 TYPE 2 DIABETES MELLITUS WITH OTHER SPECIFIED COMPLICATION, WITH LONG-TERM CURRENT USE OF INSULIN (H): Primary | ICD-10-CM

## 2025-07-29 DIAGNOSIS — K70.31 ALCOHOLIC CIRRHOSIS OF LIVER WITH ASCITES (H): ICD-10-CM

## 2025-07-29 DIAGNOSIS — K74.60 CIRRHOSIS OF LIVER WITHOUT ASCITES, UNSPECIFIED HEPATIC CIRRHOSIS TYPE (H): Primary | ICD-10-CM

## 2025-07-29 DIAGNOSIS — F33.41 RECURRENT MAJOR DEPRESSIVE DISORDER, IN PARTIAL REMISSION: ICD-10-CM

## 2025-07-29 DIAGNOSIS — E11.40 TYPE 2 DIABETES MELLITUS WITH DIABETIC NEUROPATHY, WITH LONG-TERM CURRENT USE OF INSULIN (H): ICD-10-CM

## 2025-07-29 DIAGNOSIS — E11.69 TYPE 2 DIABETES MELLITUS WITH OTHER SPECIFIED COMPLICATION, WITH LONG-TERM CURRENT USE OF INSULIN (H): Primary | ICD-10-CM

## 2025-07-29 DIAGNOSIS — Z79.4 TYPE 2 DIABETES MELLITUS WITH DIABETIC NEUROPATHY, WITH LONG-TERM CURRENT USE OF INSULIN (H): ICD-10-CM

## 2025-07-29 RX ORDER — ACYCLOVIR 400 MG/1
1 TABLET ORAL
Qty: 9 EACH | Refills: 5 | Status: SHIPPED | OUTPATIENT
Start: 2025-07-29

## 2025-07-29 RX ORDER — INSULIN GLARGINE-YFGN 100 [IU]/ML
55 INJECTION, SOLUTION SUBCUTANEOUS AT BEDTIME
Qty: 30 ML | Refills: 11 | Status: SHIPPED | OUTPATIENT
Start: 2025-07-29 | End: 2025-07-31

## 2025-07-29 NOTE — PROGRESS NOTES
Diabetes Consult Note  July 29, 2025    Virtual Visit Details    Type of service:  Video Visit   Start time: 1:05 pm  Stop time: 1:46 pm  Originating Location (pt. Location): [facility]  Distant Location (provider location):  On-site  Platform used for Video Visit: Carol Carson  is a 35 year old male with Type 2 Diabetes here for follow up by billable Video Visit. He also has a past medical history of cirrhosis, HTN, alcohol dependence with withdrawal, opiate dependence on suboxone, polysubstance abuse, Depressive disorder, Opioid abuse (H), Tobacco abuse, and Urethral stricture.           HPI:  Here for follow up of Diabetes, last seen 7/1/2025. Sooner follow up scheduled due to recent hyperglycemia.    Type 2 Diabetes was diagnosed summer 2021. Had significant weight loss and checked blood glucose with grandmother's glucometer and was 401 mg/dL, went to ER and given insulin and discharged. Took metformin for 1 week, then treatment was changed to insulin. Had been on basal insulin only since insulin therapy initiated until May 2025.  July 2025 - rapid-acting insulin added.     No history of pancreatitis found in chart review, however patient thinks he may have a positive history of mild pancreatitis in the setting of alcohol abuse.   C-Peptide 4.2 on 4/22/2025 with glucose 163.  LONG negative 4/22/2025.    Family members with diabetes:  Maternal Grandmother with Type 2 Diabetes, Paternal Grandmother Type 2 Diabetes. More distant relatives on mom's side. Paternal uncle with Type 2.    He recently underwent inpatient detox for alcohol abuse at [facility] 3/2025.  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    [place]. He had 2 relapses for alcohol on Memorial Day weekend and Father's day. He is currently at [facility] for in-patient  for alcohol treatment since 7/20/2025 with 4 week stay planned.    Living with parents currently, good support, good relationship.     Hx +alcoholic cirrhosis with ascites. Gastro appointment at [facility] in July.    Hx bulbous urethral stricture, still with occasional stinging with urination.     Patient has seen Diabetes Education Kalli Beauchamp RD 6/27/2025 who noted hyperglycemia starting essentially overnight June 23 and continuing since then. Patient denied any missed medication doses, insulin or injection problems, or CGM problems. Kalli Beauchamp RD, called me to discuss hyperglycemia and we made the plan that I would see pt for sooner follow up.    On 7/1 Hieu reported experiencing fatigue, some increased urination, some achy joints which he has had when blood glucose running high previously. He increased Semglee insulin as directed 7/27 to 45 units once daily, has still been running high, no lows. Patient also reports that he has lost his job and is on COBRA insurance this month, next month plans to use [employer] probably. He is staying at his cabin in [place], [place]. Reports a little more snacking with his family friends visiting but no big dietary changes.  We made the plan to increase insulin and recheck C-Peptide, and recommended follow up with PCP to rule out infection or other causes of elevated blood glucose.    Interim - C-Peptide 7.8 on 7/1/2025 with blood glucose 354 mg/dL. He called last week and spoke to endo on call due to elevated blood glucose, and increased Novolog to 12 units- 14 units with meals.  Saw hepatology and stopped lisinopril, started carvedilol. Starting on Camprol soon.     TODAY: at [facility] in-patient for alcohol treatment starting last Sunday 7/20. 188 mg/dL this morning. 133, 150s-160s. Treatment at [facility] will be complete approx. 8/17. Doing well at [facility] with more exercise, walking and also weekend swimming. He takes his mealtime insulin at the nursing station and  goes directly to the cafeteria to eat, knows to strictly avoid taking mealtime insulin more than 10-15 minutes prior to eating. There are lots of food choices there and he has been eating a little more.       Current Diabetes Medications:   Semglee 50 units once daily at bedtime.   Novolog 12-14 units with meals.    We reviewed glucometer/CGMS data together.  It revealed:  TIR 43%  High 43%  Very high 14%  No lows  Average blood glucose 194 mg/dL  Overall pattern: Slight increase overnight with FBG approximately 170 mg/dL on average, excursions with meals. No lows. Slightly improved blood glucose the past few days with Novolog increase.                    History of DKA:  Not found in chart but patient reports he was ketotic on diagnosis for DM2.     History of hospitalizations for diabetes:  Never    Ability to sense low blood sugars:  Intact, starts having symptoms at 100.     History of Diabetes monitoring and complications/ prevention:  CAD: no  Last eye exam results: May 2025 at [facility] per pt, with Tatyana Jones and Alejandro, no DR.  Last dental exam: Not discussed  Neuropathy: + neuropathy in toes with numbness, abnormal monofilament June 2025.  Nephropathy: No, off lisinopril per hepatology, started on carvedilol. Neg microalbumin 7/2/2025.  HTN: No vital signs today, medications as above.  On statin: Recently stopped by PCP.  Depression: Recently sober, not currently following with therapy, good support at home per patient. Current in-patient treatment for substance abuse.  Feet: No ulcers or lesions per patient.    Hieu's PMH, PSH and allergies were reviewed today and pertinent information is summarized above.    Hieu's pertinent social and family history are also reviewed today and pertinent information is summarized above.    Current Outpatient Medications   Medication Sig Dispense Refill    acamprosate (CAMPRAL) 333 MG EC tablet Take 666 mg by mouth.      acetone urine (KETOSTIX) test strip  Use as directed for elevated blood glucose >300 mg/dL or vomiting. 100 strip 2    blood glucose (NO BRAND SPECIFIED) lancets standard Use to test blood sugar 4 times daily or as directed. 100 Lancet 11    blood glucose (NO BRAND SPECIFIED) lancets standard Use to test blood sugar 2  times daily or as directed. 100 each 1    blood glucose (NO BRAND SPECIFIED) test strip Use to test blood sugar 4 times daily or as directed. 100 strip 11    blood glucose (NO BRAND SPECIFIED) test strip Use to test blood sugar 2 times daily or as directed. 200 strip 1    blood glucose monitoring (ACCU-CHEK FE PLUS) meter device kit Use to test blood sugar 2 times daily or as directed. 1 kit 0    blood glucose monitoring (NO BRAND SPECIFIED) meter device kit Use to test blood sugar 4 times daily or as directed. 1 kit 0    blood glucose monitoring (SOFTCLIX) lancets USE TO TEST BLOOD SUGAR TWO TIMES DAILY OR AS DIRECTED.      buprenorphine HCl-naloxone HCl (SUBOXONE) 8-2 MG per film Place 1 Film under the tongue 2 times daily      Continuous Glucose  (DEXCOM G7 ) TALHA USE ONE TO READ BLOOD SUGARS AS PER MANUFACTURES INSTRUCTIONS.      Continuous Glucose Sensor (DEXCOM G7 SENSOR) MISC 1 each every 10 days. Change sensor every 10 days 9 each 5    insulin aspart (NOVOLOG PEN) 100 UNIT/ML pen Inject 4-8 units three times daily with meals, max dose 24 units daily. 15 mL 11    Insulin Glargine-yfgn (SEMGLEE, YFGN,) 100 UNIT/ML SOPN Inject 50 Units subcutaneously at bedtime. 30 mL 11    insulin pen needle (32G X 4 MM) 32G X 4 MM miscellaneous Use insulin pen needles 4 times daily or as directed. 200 each 1    lactulose (CHRONULAC) 10 GM/15ML solution Take 20 g by mouth.      lisinopril (ZESTRIL) 20 MG tablet Take 0.5 tablets (10 mg) by mouth daily. NEED LABS for further refills 90 tablet 3    melatonin 3 MG tablet       sertraline (ZOLOFT) 100 MG tablet Take 100 mg by mouth daily       No current facility-administered  "medications for this visit.         ROS:   Reports good physical activity tolerance.  Denies any pedal lesions or vision changes or concerns. Denies any other acute concerns except as noted above.      Exam:    There were no vitals taken for this visit.  Wt Readings from Last 10 Encounters:   07/01/25 97.1 kg (214 lb)   05/21/25 97.1 kg (214 lb)   04/22/25 95.3 kg (210 lb)   04/25/24 105.5 kg (232 lb 9.6 oz)   10/10/23 103.9 kg (229 lb)   03/14/23 106.5 kg (234 lb 12.8 oz)   09/03/21 80.2 kg (176 lb 12.8 oz)   08/29/21 76.9 kg (169 lb 8 oz)   08/29/21 76.8 kg (169 lb 5 oz)   08/19/16 96.7 kg (213 lb 3.2 oz)     Estimated body mass index is 26.93 kg/m  as calculated from the following:    Height as of 5/21/25: 1.899 m (6' 2.75\").    Weight as of 7/1/25: 97.1 kg (214 lb).    Visual Exam (Video Visit):  GENERAL: alert and no distress  EYES: Eyes grossly normal to inspection.  No discharge or erythema, or obvious scleral/conjunctival abnormalities.  RESP: No audible wheeze, cough, or visible cyanosis.    SKIN: Visible skin clear. No significant rash, abnormal pigmentation or lesions.  NEURO: Cranial nerves grossly intact.  Mentation and speech appropriate for age.  PSYCH: Appropriate affect, tone, and pace of words          Data:      Recent Labs   Lab Test 07/01/25  1525 04/22/25  1218 04/25/24  1001 12/11/23  1200 10/10/23  1023 03/14/23  1602   A1C  --  6.6* 7.9*  --    < > 6.3*   TSH  --   --   --   --   --  1.54   LDL  --  107* 103*  --   --  120*   HDL  --  27* 30*  --   --  41   TRIG  --  109 150*  --   --  131   CR 0.49* 0.59* 0.47* 0.50*  --  0.57*   MICROL <12.0  --   --   --   --   --    AST 31  --   --  112*   < > 123*   ALT 44  --   --  94*   < > 136*    < > = values in this interval not displayed.     Lab Results   Component Value Date    CPEPT 6.8 07/01/2025    GADAB <5.0 04/22/2025     Cholesterol   Date Value Ref Range Status   04/22/2025 156 <200 mg/dL Final   04/25/2024 163 <200 mg/dL Final "       Hemoglobin   Date Value Ref Range Status   12/11/2023 14.8 13.3 - 17.7 g/dL Final   10/20/2011 14.5 13.3 - 17.7 g/dL Final   ]      Most recent GFRs:  GFR Estimate   Date Value Ref Range Status   07/01/2025 >90 >60 mL/min/1.73m2 Final     Comment:     eGFR calculated using 2021 CKD-EPI equation.   04/22/2025 >90 >60 mL/min/1.73m2 Final     Comment:     eGFR calculated using 2021 CKD-EPI equation.   04/25/2024 >90 >60 mL/min/1.73m2 Final   02/14/2020 >60 >60 mL/min/1.73m2 Final   10/20/2011 >90 >60 mL/min/1.7m2 Final   06/12/2011 >90 >60 mL/min/1.7m2 Final   03/27/2011 >90 >60 mL/min/1.7m2 Final       Lab Results   Component Value Date    CPEPT 6.8 07/01/2025    GADAB <5.0 04/22/2025     AST   Date Value Ref Range Status   07/01/2025 31 0 - 45 U/L Final   10/20/2011 20 0 - 55 U/L Final     Lab Results   Component Value Date    ALT 94 12/11/2023    ALT 22 10/20/2011             Assessment/Plan:    Type 2 Diabetes with peripheral neuropathy, with long-term use of insulin.  Blood glucose control: Not at goal, blood glucose improving with up-titration of insulin since increase in blood glucose starting June 24th, unknown cause of fairly sudden increased insulin needs. Repeat C-Peptide normal.     He is doing well at in-patient treatment for substance abuse at [facility] until approximately 8/17. Takes mealtime insulin at nurses station and proceeds directly to the cafeteria to eat. We reviewed the importance of avoiding more than 10-15 minutes wait to eat after mealtime insulin, pt endorses understanding.     Semglee out of stock at pharmacy, requesting order updated to reflect okay to substitute, sent.    Today we made the following plan:  Increase Semglee to 55 units once daily. Continue Novolog 12-14 units with meals    Needs new CGM sensors, sent.    Will fax updated Semglee/glargine dose to [facility].       1 month follow up by Video Visit.    DM Complications-   Retinopathy:  No. Up to date with eye  exam  Nephropathy:  No VS today, neg microalbuminuria 7/2025.  Creatinine stable.   Neuropathy: Yes.   Feet: OK, no ulcers or lesions per patient.   Lipids: Per patient atorvastatin discontinued by PCP.    2. Alcoholic cirrhosis of liver with ascites  Saw Hepatology 7/2025.    3. Depression  Current in-patient treatment for alcohol abuse. Per patient has good support at home.    4. Hypertension  No VS today. Followed by PCP and Hepatology.        Follow up 1 month.      40 minutes spent on the date of the encounter doing chart review, history and exam, documentation, education and counseling, as well as communication and coordination of care, and further activities as noted above.  This time excludes time spent reviewing CGM.    It is my privilege to be involved in the care of the above patient.     MARCUS Gonzalez, CNP

## 2025-07-29 NOTE — PATIENT INSTRUCTIONS
Nice to see you today!    Increase Semglee to 55 units once daily.  Continue Novolog 12-14 units with meals. Always eat within 10-15 minutes of administering Novolog.      ------------------------------------------------  [facility] is for any [facility] looking for free support to quit smoking, vaping or chewing.   [facility] [facility] will offer many quit support options and resources so [place] residents can continue to find the way to quit that works best for them.   Free support includes personalized coaching, email and text support, educational materials, and quit medication (nicotine patches, gum or lozenges) delivered by mail.     Contact [facility] [facility] at 2-800-QUIT-NOW or online at [employer] to receive support on your quit journey.     ENDOCRINE    [address] [address]    Phone number: [phone]  Fax number: [phone]    If you need a medication refill, please contact us as you may need lab work and/or a follow up visit prior to your refill.    SHARE CODES FOR YOUR SENSORS    [facility]- DEXCOM  [id number]- MARY      Thank you for choosing  [facility]!

## 2025-07-29 NOTE — LETTER
"7/29/2025      Hieu Carson  [address] [id number]      Dear Colleague,    Thank you for referring your patient, Hieu Carson, to the [facility]. Please see a copy of my visit note below.                            Insulin Glarg[facility] is costing 70$ [facility] [place] mail order? Refills \"T'eed\" up for provider.    AJAY Frye RN 7/29/2025 12:48 PM                   Diabetes Consult Note  July 29, 2025    Virtual Visit Details    Type of service:  Video Visit   Start time: 1:05 pm  Stop time: 1:46 pm  Originating Location (pt. Location): [facility]  Distant Location (provider location):  On-site  Platform used for Video Visit: Carol Carson  is a 35 year old male with Type 2 Diabetes here for follow up by billable Video Visit. He also has a past medical history of cirrhosis, HTN, alcohol dependence with withdrawal, opiate dependence on suboxone, polysubstance abuse, Depressive disorder, Opioid abuse (H), Tobacco abuse, and Urethral stricture.           HPI:  Here for follow up of Diabetes, last seen 7/1/2025. Sooner follow up scheduled due to recent hyperglycemia.    Type 2 Diabetes was diagnosed summer 2021. Had significant weight loss and checked blood glucose with grandmother's glucometer and was 401 mg/dL, went to ER and given insulin and discharged. Took metformin for 1 week, then treatment was changed to insulin. Had been on basal insulin only since insulin therapy initiated until May 2025.  July 2025 - rapid-acting insulin added.     No history of pancreatitis found in chart review, however patient thinks he may have a positive history of mild pancreatitis in the setting of alcohol abuse.   C-Peptide 4.2 on 4/22/2025 with glucose 163.  LONG negative 4/22/2025.    Family members with diabetes:  Maternal Grandmother with Type 2 Diabetes, Paternal Grandmother Type 2 Diabetes. More distant relatives on mom's side. Paternal uncle with Type " 2.    He recently underwent inpatient detox for alcohol abuse at [facility] 3/2025.  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    [place]. He had 2 relapses for alcohol on Memorial Day weekend and Father's day. He is currently at [facility] for in-patient for alcohol treatment since 7/20/2025 with 4 week stay planned.    Living with parents currently, good support, good relationship.     Hx +alcoholic cirrhosis with ascites. Gastro appointment at [facility] in July.    Hx bulbous urethral stricture, still with occasional stinging with urination.     Patient has seen Diabetes Education Kalli Beauchamp RD 6/27/2025 who noted hyperglycemia starting essentially overnight June 23 and continuing since then. Patient denied any missed medication doses, insulin or injection problems, or CGM problems. Kalli Beauchamp RD, called me to discuss hyperglycemia and we made the plan that I would see pt for sooner follow up.    On 7/1 Hieu reported experiencing fatigue, some increased urination, some achy joints which he has had when blood glucose running high previously. He increased Semglee insulin as directed 7/27 to 45 units once daily, has still been running high, no lows. Patient also reports that he has lost his job and is on COBRA insurance this month, next month plans to use [employer] probably. He is staying at his cabin in [place], [place]. Reports a little more snacking with his family friends visiting but no big dietary changes.  We made the plan to increase insulin and recheck C-Peptide, and recommended follow up with PCP to rule out infection or other causes of elevated blood glucose.    Interim - C-Peptide 7.8 on 7/1/2025 with blood glucose 354 mg/dL. He called last week and spoke to kristy on call due to elevated blood glucose, and increased Novolog to 12  units- 14 units with meals.  Saw hepatology and stopped lisinopril, started carvedilol. Starting on Camprol soon.     TODAY: at [facility] in-patient for alcohol treatment starting last Sunday 7/20. 188 mg/dL this morning. 133, 150s-160s. Treatment at [facility] will be complete approx. 8/17. Doing well at [facility] with more exercise, walking and also weekend swimming. He takes his mealtime insulin at the nursing station and goes directly to the cafeteria to eat, knows to strictly avoid taking mealtime insulin more than 10-15 minutes prior to eating. There are lots of food choices there and he has been eating a little more.       Current Diabetes Medications:   Semglee 50 units once daily at bedtime.   Novolog 12-14 units with meals.    We reviewed glucometer/CGMS data together.  It revealed:  TIR 43%  High 43%  Very high 14%  No lows  Average blood glucose 194 mg/dL  Overall pattern: Slight increase overnight with FBG approximately 170 mg/dL on average, excursions with meals. No lows. Slightly improved blood glucose the past few days with Novolog increase.                    History of DKA:  Not found in chart but patient reports he was ketotic on diagnosis for DM2.     History of hospitalizations for diabetes:  Never    Ability to sense low blood sugars:  Intact, starts having symptoms at 100.     History of Diabetes monitoring and complications/ prevention:  CAD: no  Last eye exam results: May 2025 at [facility] per pt, with Tatyana Jones and Alejandro, no DR.  Last dental exam: Not discussed  Neuropathy: + neuropathy in toes with numbness, abnormal monofilament June 2025.  Nephropathy: No, off lisinopril per hepatology, started on carvedilol. Neg microalbumin 7/2/2025.  HTN: No vital signs today, medications as above.  On statin: Recently stopped by PCP.  Depression: Recently sober, not currently following with therapy, good support at home per patient. Current in-patient treatment for substance  abuse.  Feet: No ulcers or lesions per patient.    Hieu's PMH, PSH and allergies were reviewed today and pertinent information is summarized above.    Hieu's pertinent social and family history are also reviewed today and pertinent information is summarized above.    Current Outpatient Medications   Medication Sig Dispense Refill     acamprosate (CAMPRAL) 333 MG EC tablet Take 666 mg by mouth.       acetone urine (KETOSTIX) test strip Use as directed for elevated blood glucose >300 mg/dL or vomiting. 100 strip 2     blood glucose (NO BRAND SPECIFIED) lancets standard Use to test blood sugar 4 times daily or as directed. 100 Lancet 11     blood glucose (NO BRAND SPECIFIED) lancets standard Use to test blood sugar 2  times daily or as directed. 100 each 1     blood glucose (NO BRAND SPECIFIED) test strip Use to test blood sugar 4 times daily or as directed. 100 strip 11     blood glucose (NO BRAND SPECIFIED) test strip Use to test blood sugar 2 times daily or as directed. 200 strip 1     blood glucose monitoring (ACCU-CHEK FE PLUS) meter device kit Use to test blood sugar 2 times daily or as directed. 1 kit 0     blood glucose monitoring (NO BRAND SPECIFIED) meter device kit Use to test blood sugar 4 times daily or as directed. 1 kit 0     blood glucose monitoring (SOFTCLIX) lancets USE TO TEST BLOOD SUGAR TWO TIMES DAILY OR AS DIRECTED.       buprenorphine HCl-naloxone HCl (SUBOXONE) 8-2 MG per film Place 1 Film under the tongue 2 times daily       Continuous Glucose  (DEXCOM G7 ) TALHA USE ONE TO READ BLOOD SUGARS AS PER MANUFACTURES INSTRUCTIONS.       Continuous Glucose Sensor (DEXCOM G7 SENSOR) MISC 1 each every 10 days. Change sensor every 10 days 9 each 5     insulin aspart (NOVOLOG PEN) 100 UNIT/ML pen Inject 4-8 units three times daily with meals, max dose 24 units daily. 15 mL 11     Insulin Glargine-yfgn (SEMGLEE, YFGN,) 100 UNIT/ML SOPN Inject 50 Units subcutaneously at bedtime. 30 mL 11  "    insulin pen needle (32G X 4 MM) 32G X 4 MM miscellaneous Use insulin pen needles 4 times daily or as directed. 200 each 1     lactulose (CHRONULAC) 10 GM/15ML solution Take 20 g by mouth.       lisinopril (ZESTRIL) 20 MG tablet Take 0.5 tablets (10 mg) by mouth daily. NEED LABS for further refills 90 tablet 3     melatonin 3 MG tablet        sertraline (ZOLOFT) 100 MG tablet Take 100 mg by mouth daily       No current facility-administered medications for this visit.         ROS:   Reports good physical activity tolerance.  Denies any pedal lesions or vision changes or concerns. Denies any other acute concerns except as noted above.      Exam:    There were no vitals taken for this visit.  Wt Readings from Last 10 Encounters:   07/01/25 97.1 kg (214 lb)   05/21/25 97.1 kg (214 lb)   04/22/25 95.3 kg (210 lb)   04/25/24 105.5 kg (232 lb 9.6 oz)   10/10/23 103.9 kg (229 lb)   03/14/23 106.5 kg (234 lb 12.8 oz)   09/03/21 80.2 kg (176 lb 12.8 oz)   08/29/21 76.9 kg (169 lb 8 oz)   08/29/21 76.8 kg (169 lb 5 oz)   08/19/16 96.7 kg (213 lb 3.2 oz)     Estimated body mass index is 26.93 kg/m  as calculated from the following:    Height as of 5/21/25: 1.899 m (6' 2.75\").    Weight as of 7/1/25: 97.1 kg (214 lb).    Visual Exam (Video Visit):  GENERAL: alert and no distress  EYES: Eyes grossly normal to inspection.  No discharge or erythema, or obvious scleral/conjunctival abnormalities.  RESP: No audible wheeze, cough, or visible cyanosis.    SKIN: Visible skin clear. No significant rash, abnormal pigmentation or lesions.  NEURO: Cranial nerves grossly intact.  Mentation and speech appropriate for age.  PSYCH: Appropriate affect, tone, and pace of words          Data:      Recent Labs   Lab Test 07/01/25  1525 04/22/25  1218 04/25/24  1001 12/11/23  1200 10/10/23  1023 03/14/23  1602   A1C  --  6.6* 7.9*  --    < > 6.3*   TSH  --   --   --   --   --  1.54   LDL  --  107* 103*  --   --  120*   HDL  --  27* 30*  --   --  " 41   TRIG  --  109 150*  --   --  131   CR 0.49* 0.59* 0.47* 0.50*  --  0.57*   MICROL <12.0  --   --   --   --   --    AST 31  --   --  112*   < > 123*   ALT 44  --   --  94*   < > 136*    < > = values in this interval not displayed.     Lab Results   Component Value Date    CPEPT 6.8 07/01/2025    GADAB <5.0 04/22/2025     Cholesterol   Date Value Ref Range Status   04/22/2025 156 <200 mg/dL Final   04/25/2024 163 <200 mg/dL Final       Hemoglobin   Date Value Ref Range Status   12/11/2023 14.8 13.3 - 17.7 g/dL Final   10/20/2011 14.5 13.3 - 17.7 g/dL Final   ]      Most recent GFRs:  GFR Estimate   Date Value Ref Range Status   07/01/2025 >90 >60 mL/min/1.73m2 Final     Comment:     eGFR calculated using 2021 CKD-EPI equation.   04/22/2025 >90 >60 mL/min/1.73m2 Final     Comment:     eGFR calculated using 2021 CKD-EPI equation.   04/25/2024 >90 >60 mL/min/1.73m2 Final   02/14/2020 >60 >60 mL/min/1.73m2 Final   10/20/2011 >90 >60 mL/min/1.7m2 Final   06/12/2011 >90 >60 mL/min/1.7m2 Final   03/27/2011 >90 >60 mL/min/1.7m2 Final       Lab Results   Component Value Date    CPEPT 6.8 07/01/2025    GADAB <5.0 04/22/2025     AST   Date Value Ref Range Status   07/01/2025 31 0 - 45 U/L Final   10/20/2011 20 0 - 55 U/L Final     Lab Results   Component Value Date    ALT 94 12/11/2023    ALT 22 10/20/2011             Assessment/Plan:    Type 2 Diabetes with peripheral neuropathy, with long-term use of insulin.  Blood glucose control: Not at goal, blood glucose improving with up-titration of insulin since increase in blood glucose starting June 24th, unknown cause of fairly sudden increased insulin needs. Repeat C-Peptide normal.     He is doing well at in-patient treatment for substance abuse at [facility] until approximately 8/17. Takes mealtime insulin at nurses station and proceeds directly to the cafeteria to eat. We reviewed the importance of avoiding more than 10-15 minutes wait to eat after mealtime  insulin, pt endorses understanding.     Semglee out of stock at pharmacy, requesting order updated to reflect okay to substitute, sent.    Today we made the following plan:  Increase Semglee to 55 units once daily. Continue Novolog 12-14 units with meals    Needs new CGM sensors, sent.    Will fax updated Semglee/glargine dose to [facility].       1 month follow up by Video Visit.    DM Complications-   Retinopathy:  No. Up to date with eye exam  Nephropathy:  No VS today, neg microalbuminuria 7/2025.  Creatinine stable.   Neuropathy: Yes.   Feet: OK, no ulcers or lesions per patient.   Lipids: Per patient atorvastatin discontinued by PCP.    2. Alcoholic cirrhosis of liver with ascites  Saw Hepatology 7/2025.    3. Depression  Current in-patient treatment for alcohol abuse. Per patient has good support at home.    4. Hypertension  No VS today. Followed by PCP and Hepatology.        Follow up 1 month.      40 minutes spent on the date of the encounter doing chart review, history and exam, documentation, education and counseling, as well as communication and coordination of care, and further activities as noted above.  This time excludes time spent reviewing CGM.    It is my privilege to be involved in the care of the above patient.     MARCUS oGnzalez CNP              Lab order sent. Waiting for confirmation.    AJAY Frye RN 7/29/2025 4:13 PM      Again, thank you for allowing me to participate in the care of your patient.        Sincerely,        RICHARD Gonzalez CNP    Electronically signed

## 2025-08-03 ENCOUNTER — HEALTH MAINTENANCE LETTER (OUTPATIENT)
Age: 35
End: 2025-08-03

## 2025-08-05 ENCOUNTER — TELEPHONE (OUTPATIENT)
Dept: PHARMACY | Facility: CLINIC | Age: 35
End: 2025-08-05
Payer: COMMERCIAL

## 2025-09-04 ENCOUNTER — OFFICE VISIT (OUTPATIENT)
Dept: INTERNAL MEDICINE | Facility: CLINIC | Age: 35
End: 2025-09-04
Payer: COMMERCIAL

## 2025-09-04 VITALS
HEIGHT: 74 IN | RESPIRATION RATE: 20 BRPM | HEART RATE: 100 BPM | WEIGHT: 220.4 LBS | BODY MASS INDEX: 28.28 KG/M2 | SYSTOLIC BLOOD PRESSURE: 135 MMHG | OXYGEN SATURATION: 97 % | DIASTOLIC BLOOD PRESSURE: 87 MMHG

## 2025-09-04 DIAGNOSIS — K76.6 PORTAL HYPERTENSION (H): ICD-10-CM

## 2025-09-04 DIAGNOSIS — K70.30 ALCOHOLIC CIRRHOSIS OF LIVER WITHOUT ASCITES (H): Primary | ICD-10-CM

## 2025-09-04 DIAGNOSIS — F10.230 ALCOHOL DEPENDENCE WITH WITHDRAWAL, UNCOMPLICATED (H): ICD-10-CM

## 2025-09-04 DIAGNOSIS — E11.40 TYPE 2 DIABETES MELLITUS WITH DIABETIC NEUROPATHY, WITH LONG-TERM CURRENT USE OF INSULIN (H): ICD-10-CM

## 2025-09-04 DIAGNOSIS — Z79.4 TYPE 2 DIABETES MELLITUS WITH DIABETIC NEUROPATHY, WITH LONG-TERM CURRENT USE OF INSULIN (H): ICD-10-CM

## 2025-09-04 DIAGNOSIS — D69.6 THROMBOCYTOPENIA: ICD-10-CM

## 2025-09-04 DIAGNOSIS — F11.21 OPIOID DEPENDENCE IN REMISSION (H): ICD-10-CM

## 2025-09-04 DIAGNOSIS — R60.0 PERIPHERAL EDEMA: ICD-10-CM

## 2025-09-04 RX ORDER — GABAPENTIN 300 MG/1
CAPSULE ORAL
COMMUNITY
Start: 2025-08-18

## 2025-09-04 RX ORDER — CARVEDILOL 3.12 MG/1
3.12 TABLET ORAL 2 TIMES DAILY WITH MEALS
COMMUNITY
Start: 2025-08-16

## 2025-09-04 RX ORDER — MULTIPLE VITAMINS W/ MINERALS TAB 9MG-400MCG
1 TAB ORAL DAILY
Qty: 100 TABLET | COMMUNITY
Start: 2025-09-04 | End: 2025-12-13

## 2025-09-04 RX ORDER — OMEPRAZOLE 20 MG/1
CAPSULE, DELAYED RELEASE ORAL
COMMUNITY
Start: 2025-07-24

## 2025-09-04 RX ORDER — SPIRONOLACTONE 25 MG/1
25 TABLET ORAL DAILY
Qty: 30 TABLET | Refills: 1 | Status: SHIPPED | OUTPATIENT
Start: 2025-09-04

## 2025-09-04 RX ORDER — INSULIN GLARGINE-YFGN 100 [IU]/ML
55 INJECTION, SOLUTION SUBCUTANEOUS AT BEDTIME
COMMUNITY
Start: 2025-08-04

## 2025-09-04 ASSESSMENT — PATIENT HEALTH QUESTIONNAIRE - PHQ9
SUM OF ALL RESPONSES TO PHQ QUESTIONS 1-9: 3
10. IF YOU CHECKED OFF ANY PROBLEMS, HOW DIFFICULT HAVE THESE PROBLEMS MADE IT FOR YOU TO DO YOUR WORK, TAKE CARE OF THINGS AT HOME, OR GET ALONG WITH OTHER PEOPLE: SOMEWHAT DIFFICULT
SUM OF ALL RESPONSES TO PHQ QUESTIONS 1-9: 3